# Patient Record
Sex: MALE | Race: OTHER | HISPANIC OR LATINO
[De-identification: names, ages, dates, MRNs, and addresses within clinical notes are randomized per-mention and may not be internally consistent; named-entity substitution may affect disease eponyms.]

---

## 2017-01-05 ENCOUNTER — APPOINTMENT (OUTPATIENT)
Dept: INTERNAL MEDICINE | Facility: CLINIC | Age: 32
End: 2017-01-05

## 2017-01-05 RX ORDER — CIPROFLOXACIN 3 MG/ML
0.3 SOLUTION OPHTHALMIC
Qty: 1 | Refills: 3 | Status: COMPLETED | COMMUNITY
Start: 2017-01-05 | End: 2017-05-05

## 2017-02-22 ENCOUNTER — APPOINTMENT (OUTPATIENT)
Dept: OPHTHALMOLOGY | Facility: CLINIC | Age: 32
End: 2017-02-22

## 2017-03-13 ENCOUNTER — MEDICATION RENEWAL (OUTPATIENT)
Age: 32
End: 2017-03-13

## 2017-05-30 ENCOUNTER — LABORATORY RESULT (OUTPATIENT)
Age: 32
End: 2017-05-30

## 2017-05-30 ENCOUNTER — APPOINTMENT (OUTPATIENT)
Dept: INTERNAL MEDICINE | Facility: CLINIC | Age: 32
End: 2017-05-30

## 2017-05-30 VITALS
RESPIRATION RATE: 12 BRPM | TEMPERATURE: 98.3 F | OXYGEN SATURATION: 98 % | WEIGHT: 222 LBS | DIASTOLIC BLOOD PRESSURE: 79 MMHG | BODY MASS INDEX: 31.78 KG/M2 | SYSTOLIC BLOOD PRESSURE: 121 MMHG | HEIGHT: 70 IN | HEART RATE: 80 BPM

## 2017-05-30 DIAGNOSIS — B02.9 ZOSTER W/OUT COMPLICATIONS: ICD-10-CM

## 2017-05-30 DIAGNOSIS — Z84.1 FAMILY HISTORY OF DISORDERS OF KIDNEY AND URETER: ICD-10-CM

## 2017-05-30 RX ORDER — PANCRELIPASE LIPASE, PANCRELIPASE AMYLASE, AND PANCRELIPASE PROTEASE 4200; 24600; 14200 [USP'U]/1; [USP'U]/1; [USP'U]/1
4200-14200 CAPSULE, DELAYED RELEASE ORAL
Refills: 0 | Status: DISCONTINUED | COMMUNITY
End: 2017-05-30

## 2017-05-30 RX ORDER — ALBUTEROL SULFATE 90 UG/1
AEROSOL, METERED RESPIRATORY (INHALATION)
Refills: 0 | Status: DISCONTINUED | COMMUNITY
End: 2017-05-30

## 2017-05-30 RX ORDER — OSELTAMIVIR PHOSPHATE 75 MG/1
75 CAPSULE ORAL
Qty: 10 | Refills: 0 | Status: DISCONTINUED | COMMUNITY
Start: 2017-03-13 | End: 2017-05-30

## 2017-05-31 LAB
25(OH)D3 SERPL-MCNC: 14.6 NG/ML
ALBUMIN SERPL ELPH-MCNC: 4.3 G/DL
ALP BLD-CCNC: 85 U/L
ALT SERPL-CCNC: 29 U/L
ANION GAP SERPL CALC-SCNC: 12 MMOL/L
AST SERPL-CCNC: 20 U/L
BILIRUB SERPL-MCNC: 0.3 MG/DL
BUN SERPL-MCNC: 11 MG/DL
CALCIUM SERPL-MCNC: 9.5 MG/DL
CHLORIDE SERPL-SCNC: 103 MMOL/L
CHOLEST SERPL-MCNC: 205 MG/DL
CHOLEST/HDLC SERPL: 5 RATIO
CO2 SERPL-SCNC: 25 MMOL/L
CREAT SERPL-MCNC: 0.9 MG/DL
FOLATE RBC-MCNC: 1229 NG/ML
FOLATE SERPL-MCNC: 9.7 NG/ML
GLUCOSE SERPL-MCNC: 99 MG/DL
HAV IGM SER QL: NONREACTIVE
HBV CORE IGM SER QL: NONREACTIVE
HBV SURFACE AG SER QL: NONREACTIVE
HCT VFR BLD CALC: 45 %
HCV AB SER QL: NONREACTIVE
HCV S/CO RATIO: 0.09 S/CO
HDLC SERPL-MCNC: 41 MG/DL
HIV1+2 AB SPEC QL IA.RAPID: NONREACTIVE
HSV 1+2 IGG SER IA-IMP: NEGATIVE
HSV 1+2 IGG SER IA-IMP: NEGATIVE
HSV1 IGG SER QL: 0.14 INDEX
HSV2 IGG SER QL: 0.07 INDEX
LDLC SERPL CALC-MCNC: 106 MG/DL
POTASSIUM SERPL-SCNC: 5 MMOL/L
PROT SERPL-MCNC: 7.3 G/DL
SODIUM SERPL-SCNC: 140 MMOL/L
TRIGL SERPL-MCNC: 290 MG/DL
TSH SERPL-ACNC: 1.34 UIU/ML
VIT B12 SERPL-MCNC: 507 PG/ML

## 2017-06-01 LAB
BASOPHILS # BLD AUTO: 0.01 K/UL
BASOPHILS NFR BLD AUTO: 0.2 %
C TRACH RRNA SPEC QL NAA+PROBE: NORMAL
EOSINOPHIL # BLD AUTO: 0.09 K/UL
EOSINOPHIL NFR BLD AUTO: 1.5 %
HCT VFR BLD CALC: 45.3 %
HGB BLD-MCNC: 14.2 G/DL
IMM GRANULOCYTES NFR BLD AUTO: 0.3 %
LYMPHOCYTES # BLD AUTO: 1.72 K/UL
LYMPHOCYTES NFR BLD AUTO: 28.8 %
MAN DIFF?: NORMAL
MCHC RBC-ENTMCNC: 30.9 PG
MCHC RBC-ENTMCNC: 31.3 GM/DL
MCV RBC AUTO: 98.7 FL
MONOCYTES # BLD AUTO: 0.55 K/UL
MONOCYTES NFR BLD AUTO: 9.2 %
N GONORRHOEA RRNA SPEC QL NAA+PROBE: NORMAL
NEUTROPHILS # BLD AUTO: 3.58 K/UL
NEUTROPHILS NFR BLD AUTO: 60 %
PLATELET # BLD AUTO: 258 K/UL
RBC # BLD: 4.59 M/UL
RBC # FLD: 12.6 %
SOURCE AMPLIFICATION: NORMAL
WBC # FLD AUTO: 5.97 K/UL

## 2017-06-01 RX ORDER — CHOLECALCIFEROL (VITAMIN D3) 50 MCG
50 MCG TABLET ORAL
Qty: 30 | Refills: 3 | Status: ACTIVE | COMMUNITY
Start: 2017-06-01 | End: 1900-01-01

## 2017-06-22 ENCOUNTER — APPOINTMENT (OUTPATIENT)
Dept: HEMATOLOGY ONCOLOGY | Facility: CLINIC | Age: 32
End: 2017-06-22

## 2017-06-29 ENCOUNTER — APPOINTMENT (OUTPATIENT)
Dept: INTERNAL MEDICINE | Facility: CLINIC | Age: 32
End: 2017-06-29

## 2017-08-10 ENCOUNTER — LABORATORY RESULT (OUTPATIENT)
Age: 32
End: 2017-08-10

## 2017-08-10 ENCOUNTER — APPOINTMENT (OUTPATIENT)
Dept: INTERNAL MEDICINE | Facility: CLINIC | Age: 32
End: 2017-08-10
Payer: COMMERCIAL

## 2017-08-10 ENCOUNTER — NON-APPOINTMENT (OUTPATIENT)
Age: 32
End: 2017-08-10

## 2017-08-10 VITALS
OXYGEN SATURATION: 100 % | HEART RATE: 86 BPM | DIASTOLIC BLOOD PRESSURE: 74 MMHG | RESPIRATION RATE: 12 BRPM | BODY MASS INDEX: 32.07 KG/M2 | TEMPERATURE: 99 F | SYSTOLIC BLOOD PRESSURE: 103 MMHG | HEIGHT: 70 IN | WEIGHT: 224 LBS

## 2017-08-10 VITALS — DIASTOLIC BLOOD PRESSURE: 60 MMHG | SYSTOLIC BLOOD PRESSURE: 120 MMHG

## 2017-08-10 DIAGNOSIS — K58.9 IRRITABLE BOWEL SYNDROME W/OUT DIARRHEA: ICD-10-CM

## 2017-08-10 DIAGNOSIS — K90.9 INTESTINAL MALABSORPTION, UNSPECIFIED: ICD-10-CM

## 2017-08-10 PROCEDURE — 99395 PREV VISIT EST AGE 18-39: CPT | Mod: 25

## 2017-08-10 PROCEDURE — 93000 ELECTROCARDIOGRAM COMPLETE: CPT

## 2017-08-11 ENCOUNTER — NON-APPOINTMENT (OUTPATIENT)
Age: 32
End: 2017-08-11

## 2017-08-11 LAB
ALBUMIN SERPL ELPH-MCNC: 4.3 G/DL
ALP BLD-CCNC: 80 U/L
ALT SERPL-CCNC: 33 U/L
ANION GAP SERPL CALC-SCNC: 18 MMOL/L
AST SERPL-CCNC: 25 U/L
BASOPHILS # BLD AUTO: 0.01 K/UL
BASOPHILS NFR BLD AUTO: 0.1 %
BILIRUB SERPL-MCNC: 0.2 MG/DL
BUN SERPL-MCNC: 14 MG/DL
CALCIUM SERPL-MCNC: 9.1 MG/DL
CHLORIDE SERPL-SCNC: 105 MMOL/L
CHOLEST SERPL-MCNC: 199 MG/DL
CHOLEST/HDLC SERPL: 5.1 RATIO
CO2 SERPL-SCNC: 20 MMOL/L
CREAT SERPL-MCNC: 0.87 MG/DL
EOSINOPHIL # BLD AUTO: 0.17 K/UL
EOSINOPHIL NFR BLD AUTO: 2.1 %
GLUCOSE SERPL-MCNC: 86 MG/DL
HCT VFR BLD CALC: 42.6 %
HDLC SERPL-MCNC: 39 MG/DL
HGB BLD-MCNC: 13.8 G/DL
IMM GRANULOCYTES NFR BLD AUTO: 0.2 %
LDLC SERPL CALC-MCNC: 108 MG/DL
LYMPHOCYTES # BLD AUTO: 2.68 K/UL
LYMPHOCYTES NFR BLD AUTO: 33.3 %
MAN DIFF?: NORMAL
MCHC RBC-ENTMCNC: 31.9 PG
MCHC RBC-ENTMCNC: 32.4 GM/DL
MCV RBC AUTO: 98.6 FL
MONOCYTES # BLD AUTO: 0.64 K/UL
MONOCYTES NFR BLD AUTO: 8 %
NEUTROPHILS # BLD AUTO: 4.52 K/UL
NEUTROPHILS NFR BLD AUTO: 56.3 %
PLATELET # BLD AUTO: 259 K/UL
POTASSIUM SERPL-SCNC: 4.2 MMOL/L
PROT SERPL-MCNC: 7.7 G/DL
RBC # BLD: 4.32 M/UL
RBC # FLD: 12.7 %
SODIUM SERPL-SCNC: 143 MMOL/L
TRIGL SERPL-MCNC: 261 MG/DL
TSH SERPL-ACNC: 0.61 UIU/ML
WBC # FLD AUTO: 8.04 K/UL

## 2017-08-14 LAB — 25(OH)D3 SERPL-MCNC: 22.9 NG/ML

## 2017-08-16 LAB
TESTOST BND SERPL-MCNC: 7.5 PG/ML
TESTOST SERPL-MCNC: 233.2 NG/DL

## 2017-08-18 LAB
APPEARANCE: CLEAR
BILIRUBIN URINE: NEGATIVE
BLOOD URINE: NEGATIVE
COLOR: YELLOW
GLUCOSE QUALITATIVE U: NORMAL MG/DL
KETONES URINE: ABNORMAL
LEUKOCYTE ESTERASE URINE: NEGATIVE
NITRITE URINE: NEGATIVE
PH URINE: 5.5
PROTEIN URINE: ABNORMAL MG/DL
SPECIFIC GRAVITY URINE: 1.03
UROBILINOGEN URINE: NORMAL MG/DL

## 2017-09-22 ENCOUNTER — RX RENEWAL (OUTPATIENT)
Age: 32
End: 2017-09-22

## 2017-11-22 ENCOUNTER — TRANSCRIPTION ENCOUNTER (OUTPATIENT)
Age: 32
End: 2017-11-22

## 2018-03-26 ENCOUNTER — APPOINTMENT (OUTPATIENT)
Dept: INTERNAL MEDICINE | Facility: CLINIC | Age: 33
End: 2018-03-26
Payer: COMMERCIAL

## 2018-03-26 VITALS
WEIGHT: 232 LBS | DIASTOLIC BLOOD PRESSURE: 83 MMHG | OXYGEN SATURATION: 97 % | SYSTOLIC BLOOD PRESSURE: 142 MMHG | RESPIRATION RATE: 12 BRPM | HEIGHT: 70 IN | TEMPERATURE: 98.3 F | BODY MASS INDEX: 33.21 KG/M2 | HEART RATE: 93 BPM

## 2018-03-26 VITALS — DIASTOLIC BLOOD PRESSURE: 80 MMHG | SYSTOLIC BLOOD PRESSURE: 120 MMHG

## 2018-03-26 DIAGNOSIS — Z78.9 OTHER SPECIFIED HEALTH STATUS: ICD-10-CM

## 2018-03-26 PROCEDURE — 99215 OFFICE O/P EST HI 40 MIN: CPT

## 2018-04-02 ENCOUNTER — APPOINTMENT (OUTPATIENT)
Dept: NEUROLOGY | Facility: CLINIC | Age: 33
End: 2018-04-02
Payer: COMMERCIAL

## 2018-04-02 ENCOUNTER — APPOINTMENT (OUTPATIENT)
Dept: OTOLARYNGOLOGY | Facility: CLINIC | Age: 33
End: 2018-04-02
Payer: COMMERCIAL

## 2018-04-02 VITALS
DIASTOLIC BLOOD PRESSURE: 69 MMHG | BODY MASS INDEX: 32.93 KG/M2 | SYSTOLIC BLOOD PRESSURE: 131 MMHG | HEART RATE: 86 BPM | WEIGHT: 230 LBS | HEIGHT: 70 IN

## 2018-04-02 VITALS
BODY MASS INDEX: 32.93 KG/M2 | DIASTOLIC BLOOD PRESSURE: 72 MMHG | HEART RATE: 82 BPM | HEIGHT: 70 IN | WEIGHT: 230 LBS | SYSTOLIC BLOOD PRESSURE: 117 MMHG

## 2018-04-02 DIAGNOSIS — Z78.9 OTHER SPECIFIED HEALTH STATUS: ICD-10-CM

## 2018-04-02 DIAGNOSIS — Z83.511 FAMILY HISTORY OF GLAUCOMA: ICD-10-CM

## 2018-04-02 DIAGNOSIS — J30.1 ALLERGIC RHINITIS DUE TO POLLEN: ICD-10-CM

## 2018-04-02 DIAGNOSIS — L80 VITILIGO: ICD-10-CM

## 2018-04-02 DIAGNOSIS — Z86.39 PERSONAL HISTORY OF OTHER ENDOCRINE, NUTRITIONAL AND METABOLIC DISEASE: ICD-10-CM

## 2018-04-02 PROCEDURE — 99204 OFFICE O/P NEW MOD 45 MIN: CPT | Mod: 25

## 2018-04-02 PROCEDURE — 92550 TYMPANOMETRY & REFLEX THRESH: CPT

## 2018-04-02 PROCEDURE — 99205 OFFICE O/P NEW HI 60 MIN: CPT

## 2018-04-02 PROCEDURE — 92557 COMPREHENSIVE HEARING TEST: CPT

## 2018-04-03 ENCOUNTER — APPOINTMENT (OUTPATIENT)
Dept: MRI IMAGING | Facility: CLINIC | Age: 33
End: 2018-04-03
Payer: COMMERCIAL

## 2018-04-03 ENCOUNTER — OUTPATIENT (OUTPATIENT)
Dept: OUTPATIENT SERVICES | Facility: HOSPITAL | Age: 33
LOS: 1 days | End: 2018-04-03
Payer: COMMERCIAL

## 2018-04-03 DIAGNOSIS — Z00.8 ENCOUNTER FOR OTHER GENERAL EXAMINATION: ICD-10-CM

## 2018-04-03 PROCEDURE — 70549 MR ANGIOGRAPH NECK W/O&W/DYE: CPT

## 2018-04-03 PROCEDURE — 70546 MR ANGIOGRAPH HEAD W/O&W/DYE: CPT

## 2018-04-03 PROCEDURE — 70546 MR ANGIOGRAPH HEAD W/O&W/DYE: CPT | Mod: 26,59

## 2018-04-03 PROCEDURE — 70553 MRI BRAIN STEM W/O & W/DYE: CPT | Mod: 26

## 2018-04-03 PROCEDURE — 70549 MR ANGIOGRAPH NECK W/O&W/DYE: CPT | Mod: 26

## 2018-04-03 PROCEDURE — A9585: CPT

## 2018-04-03 PROCEDURE — 70553 MRI BRAIN STEM W/O & W/DYE: CPT

## 2018-04-04 ENCOUNTER — APPOINTMENT (OUTPATIENT)
Dept: OPHTHALMOLOGY | Facility: CLINIC | Age: 33
End: 2018-04-04

## 2018-04-10 ENCOUNTER — OTHER (OUTPATIENT)
Age: 33
End: 2018-04-10

## 2018-04-10 ENCOUNTER — MESSAGE (OUTPATIENT)
Age: 33
End: 2018-04-10

## 2018-04-17 ENCOUNTER — APPOINTMENT (OUTPATIENT)
Dept: INTERNAL MEDICINE | Facility: CLINIC | Age: 33
End: 2018-04-17

## 2018-05-16 ENCOUNTER — APPOINTMENT (OUTPATIENT)
Dept: OTOLARYNGOLOGY | Facility: CLINIC | Age: 33
End: 2018-05-16
Payer: COMMERCIAL

## 2018-05-16 PROCEDURE — 92585: CPT

## 2018-05-16 PROCEDURE — 92537 CALORIC VSTBLR TEST W/REC: CPT

## 2018-05-16 PROCEDURE — 92584 ELECTROCOCHLEOGRAPHY: CPT

## 2018-05-16 PROCEDURE — 92540 BASIC VESTIBULAR EVALUATION: CPT

## 2018-05-18 ENCOUNTER — MESSAGE (OUTPATIENT)
Age: 33
End: 2018-05-18

## 2018-07-02 ENCOUNTER — APPOINTMENT (OUTPATIENT)
Dept: NEUROLOGY | Facility: CLINIC | Age: 33
End: 2018-07-02
Payer: COMMERCIAL

## 2018-07-02 VITALS
BODY MASS INDEX: 33.21 KG/M2 | WEIGHT: 232 LBS | HEIGHT: 70 IN | SYSTOLIC BLOOD PRESSURE: 101 MMHG | HEART RATE: 94 BPM | DIASTOLIC BLOOD PRESSURE: 68 MMHG

## 2018-07-02 DIAGNOSIS — G43.109 MIGRAINE WITH AURA, NOT INTRACTABLE, W/OUT STATUS MIGRAINOSUS: ICD-10-CM

## 2018-07-02 DIAGNOSIS — H93.13 TINNITUS, BILATERAL: ICD-10-CM

## 2018-07-02 DIAGNOSIS — H90.3 SENSORINEURAL HEARING LOSS, BILATERAL: ICD-10-CM

## 2018-07-02 DIAGNOSIS — H90.41 SENSORINEURAL HEARING LOSS, UNILATERAL, RIGHT EAR, WITH UNRESTRICTED HEARING ON THE CONTRALATERAL SIDE: ICD-10-CM

## 2018-07-02 PROCEDURE — 99214 OFFICE O/P EST MOD 30 MIN: CPT

## 2018-08-16 ENCOUNTER — APPOINTMENT (OUTPATIENT)
Dept: OTOLARYNGOLOGY | Facility: CLINIC | Age: 33
End: 2018-08-16
Payer: COMMERCIAL

## 2018-08-16 VITALS
WEIGHT: 232 LBS | HEIGHT: 70 IN | HEART RATE: 89 BPM | BODY MASS INDEX: 33.21 KG/M2 | SYSTOLIC BLOOD PRESSURE: 123 MMHG | DIASTOLIC BLOOD PRESSURE: 80 MMHG

## 2018-08-16 DIAGNOSIS — Z87.19 PERSONAL HISTORY OF OTHER DISEASES OF THE DIGESTIVE SYSTEM: ICD-10-CM

## 2018-08-16 DIAGNOSIS — Z83.3 FAMILY HISTORY OF DIABETES MELLITUS: ICD-10-CM

## 2018-08-16 DIAGNOSIS — Z88.9 ALLERGY STATUS TO UNSPECIFIED DRUGS, MEDICAMENTS AND BIOLOGICAL SUBSTANCES: ICD-10-CM

## 2018-08-16 DIAGNOSIS — Z80.9 FAMILY HISTORY OF MALIGNANT NEOPLASM, UNSPECIFIED: ICD-10-CM

## 2018-08-16 PROBLEM — H93.13 TINNITUS, BILATERAL: Status: ACTIVE | Noted: 2018-04-02

## 2018-08-16 PROBLEM — H90.3 SENSORY HEARING LOSS, BILATERAL: Status: ACTIVE | Noted: 2018-04-02

## 2018-08-16 PROBLEM — H90.41 RIGHT ASYMMETRICAL SNHL: Status: ACTIVE | Noted: 2018-04-02

## 2018-08-16 PROBLEM — G43.109 MIGRAINE WITH AURA AND WITHOUT STATUS MIGRAINOSUS, NOT INTRACTABLE: Status: ACTIVE | Noted: 2017-02-22

## 2018-08-16 PROCEDURE — 99214 OFFICE O/P EST MOD 30 MIN: CPT

## 2018-09-04 ENCOUNTER — APPOINTMENT (OUTPATIENT)
Dept: RADIOLOGY | Facility: CLINIC | Age: 33
End: 2018-09-04
Payer: COMMERCIAL

## 2018-09-04 ENCOUNTER — OUTPATIENT (OUTPATIENT)
Dept: OUTPATIENT SERVICES | Facility: HOSPITAL | Age: 33
LOS: 1 days | End: 2018-09-04
Payer: COMMERCIAL

## 2018-09-04 DIAGNOSIS — Z00.8 ENCOUNTER FOR OTHER GENERAL EXAMINATION: ICD-10-CM

## 2018-09-04 PROCEDURE — 72110 X-RAY EXAM L-2 SPINE 4/>VWS: CPT | Mod: 26

## 2018-09-04 PROCEDURE — 72110 X-RAY EXAM L-2 SPINE 4/>VWS: CPT

## 2018-09-04 PROCEDURE — 72050 X-RAY EXAM NECK SPINE 4/5VWS: CPT | Mod: 26

## 2018-09-04 PROCEDURE — 72050 X-RAY EXAM NECK SPINE 4/5VWS: CPT

## 2018-09-14 ENCOUNTER — OUTPATIENT (OUTPATIENT)
Dept: OUTPATIENT SERVICES | Facility: HOSPITAL | Age: 33
LOS: 1 days | End: 2018-09-14
Payer: COMMERCIAL

## 2018-09-14 ENCOUNTER — APPOINTMENT (OUTPATIENT)
Dept: MRI IMAGING | Facility: CLINIC | Age: 33
End: 2018-09-14
Payer: COMMERCIAL

## 2018-09-14 DIAGNOSIS — Z00.8 ENCOUNTER FOR OTHER GENERAL EXAMINATION: ICD-10-CM

## 2018-09-14 PROCEDURE — 72141 MRI NECK SPINE W/O DYE: CPT

## 2018-09-14 PROCEDURE — 72141 MRI NECK SPINE W/O DYE: CPT | Mod: 26

## 2018-10-24 ENCOUNTER — APPOINTMENT (OUTPATIENT)
Dept: ENDOCRINOLOGY | Facility: CLINIC | Age: 33
End: 2018-10-24
Payer: COMMERCIAL

## 2018-10-24 VITALS
SYSTOLIC BLOOD PRESSURE: 112 MMHG | DIASTOLIC BLOOD PRESSURE: 78 MMHG | BODY MASS INDEX: 32.21 KG/M2 | HEIGHT: 70 IN | HEART RATE: 105 BPM | WEIGHT: 225 LBS | OXYGEN SATURATION: 98 %

## 2018-10-24 PROCEDURE — 99204 OFFICE O/P NEW MOD 45 MIN: CPT

## 2018-10-24 RX ORDER — ALBUTEROL SULFATE 90 UG/1
108 (90 BASE) AEROSOL, METERED RESPIRATORY (INHALATION)
Qty: 6.7 | Refills: 5 | Status: COMPLETED | COMMUNITY
Start: 2017-01-05 | End: 2018-10-24

## 2018-10-24 RX ORDER — CEFUROXIME AXETIL 500 MG/1
500 TABLET ORAL
Qty: 14 | Refills: 0 | Status: COMPLETED | COMMUNITY
Start: 2018-03-26 | End: 2018-10-24

## 2018-10-24 RX ORDER — METHYLPREDNISOLONE 4 MG/1
4 TABLET ORAL
Qty: 21 | Refills: 0 | Status: COMPLETED | COMMUNITY
Start: 2018-08-16 | End: 2018-10-24

## 2018-10-24 RX ORDER — SACCHAROMYCES BOULARDII 50 MG
250 CAPSULE ORAL TWICE DAILY
Qty: 40 | Refills: 0 | Status: COMPLETED | COMMUNITY
Start: 2017-09-22 | End: 2018-10-24

## 2018-10-24 RX ORDER — DIAZEPAM 5 MG/1
5 TABLET ORAL
Qty: 14 | Refills: 0 | Status: COMPLETED | COMMUNITY
Start: 2018-08-16 | End: 2018-10-24

## 2018-10-27 ENCOUNTER — EMERGENCY (EMERGENCY)
Facility: HOSPITAL | Age: 33
LOS: 1 days | Discharge: HOME | End: 2018-10-27
Attending: EMERGENCY MEDICINE
Payer: COMMERCIAL

## 2018-10-27 VITALS
WEIGHT: 210.1 LBS | SYSTOLIC BLOOD PRESSURE: 126 MMHG | HEIGHT: 70 IN | OXYGEN SATURATION: 98 % | TEMPERATURE: 98 F | RESPIRATION RATE: 18 BRPM | HEART RATE: 94 BPM | DIASTOLIC BLOOD PRESSURE: 86 MMHG

## 2018-10-27 PROCEDURE — 76870 US EXAM SCROTUM: CPT | Mod: 26

## 2018-10-27 PROCEDURE — 99284 EMERGENCY DEPT VISIT MOD MDM: CPT | Mod: 25

## 2018-10-27 RX ORDER — MORPHINE SULFATE 50 MG/1
4 CAPSULE, EXTENDED RELEASE ORAL ONCE
Qty: 0 | Refills: 0 | Status: DISCONTINUED | OUTPATIENT
Start: 2018-10-27 | End: 2018-10-27

## 2018-10-27 RX ADMIN — MORPHINE SULFATE 4 MILLIGRAM(S): 50 CAPSULE, EXTENDED RELEASE ORAL at 23:05

## 2018-10-28 PROCEDURE — 96374 THER/PROPH/DIAG INJ IV PUSH: CPT

## 2018-10-28 PROCEDURE — 76870 US EXAM SCROTUM: CPT

## 2018-10-28 PROCEDURE — 96372 THER/PROPH/DIAG INJ SC/IM: CPT | Mod: XU

## 2018-10-28 PROCEDURE — 99284 EMERGENCY DEPT VISIT MOD MDM: CPT | Mod: 25

## 2018-10-28 RX ORDER — CEFTRIAXONE 500 MG/1
250 INJECTION, POWDER, FOR SOLUTION INTRAMUSCULAR; INTRAVENOUS ONCE
Qty: 0 | Refills: 0 | Status: COMPLETED | OUTPATIENT
Start: 2018-10-28 | End: 2018-10-28

## 2018-10-28 RX ORDER — IBUPROFEN 200 MG
600 TABLET ORAL ONCE
Qty: 0 | Refills: 0 | Status: COMPLETED | OUTPATIENT
Start: 2018-10-28 | End: 2018-10-28

## 2018-10-28 RX ORDER — IBUPROFEN 200 MG
1 TABLET ORAL
Qty: 15 | Refills: 0 | OUTPATIENT
Start: 2018-10-28 | End: 2018-11-01

## 2018-10-28 RX ORDER — CEPHALEXIN 500 MG
1 CAPSULE ORAL
Qty: 21 | Refills: 0 | OUTPATIENT
Start: 2018-10-28 | End: 2018-11-03

## 2018-10-28 RX ORDER — AZITHROMYCIN 500 MG/1
1000 TABLET, FILM COATED ORAL ONCE
Qty: 0 | Refills: 0 | Status: COMPLETED | OUTPATIENT
Start: 2018-10-28 | End: 2018-10-28

## 2018-10-28 RX ADMIN — CEFTRIAXONE 250 MILLIGRAM(S): 500 INJECTION, POWDER, FOR SOLUTION INTRAMUSCULAR; INTRAVENOUS at 00:42

## 2018-10-28 RX ADMIN — Medication 600 MILLIGRAM(S): at 00:42

## 2018-10-28 RX ADMIN — AZITHROMYCIN 1000 MILLIGRAM(S): 500 TABLET, FILM COATED ORAL at 00:42

## 2018-10-28 NOTE — ED PROVIDER NOTE - MEDICAL DECISION MAKING DETAILS
32 year old M Pt w/ right testicular pain, will sonogram to rule out testicular torsion and pain management

## 2018-10-28 NOTE — ED PROVIDER NOTE - OBJECTIVE STATEMENT
32 year old M Pt w/ PMHx of Low Testosterone presents to ED c/o testicular pain. Pt last visit to endocrinologist, while there doctor manipulated right testicle, Pt felt sudden onset of right testicular pain, described as dull aching pain until today, Today, pain worsened until intolerable. NKDA

## 2018-10-28 NOTE — ED ADULT NURSE NOTE - TEMPLATE
Alert and oriented to person, place, time/situation. normal mood and affect. no apparent risk to self or others.  Symptoms

## 2018-10-29 ENCOUNTER — LABORATORY RESULT (OUTPATIENT)
Age: 33
End: 2018-10-29

## 2018-10-29 ENCOUNTER — APPOINTMENT (OUTPATIENT)
Dept: INTERNAL MEDICINE | Facility: CLINIC | Age: 33
End: 2018-10-29
Payer: COMMERCIAL

## 2018-10-29 VITALS
WEIGHT: 220 LBS | SYSTOLIC BLOOD PRESSURE: 120 MMHG | HEIGHT: 70 IN | DIASTOLIC BLOOD PRESSURE: 70 MMHG | BODY MASS INDEX: 31.5 KG/M2

## 2018-10-29 DIAGNOSIS — Z87.898 PERSONAL HISTORY OF OTHER SPECIFIED CONDITIONS: ICD-10-CM

## 2018-10-29 DIAGNOSIS — Z87.09 PERSONAL HISTORY OF OTHER DISEASES OF THE RESPIRATORY SYSTEM: ICD-10-CM

## 2018-10-29 DIAGNOSIS — Z87.39 PERSONAL HISTORY OF OTHER DISEASES OF THE MUSCULOSKELETAL SYSTEM AND CONNECTIVE TISSUE: ICD-10-CM

## 2018-10-29 DIAGNOSIS — E29.1 TESTICULAR HYPOFUNCTION: ICD-10-CM

## 2018-10-29 DIAGNOSIS — Z86.69 PERSONAL HISTORY OF OTHER DISEASES OF THE NERVOUS SYSTEM AND SENSE ORGANS: ICD-10-CM

## 2018-10-29 DIAGNOSIS — R07.89 OTHER CHEST PAIN: ICD-10-CM

## 2018-10-29 DIAGNOSIS — Z00.00 ENCOUNTER FOR GENERAL ADULT MEDICAL EXAMINATION W/OUT ABNORMAL FINDINGS: ICD-10-CM

## 2018-10-29 DIAGNOSIS — H53.9 UNSPECIFIED VISUAL DISTURBANCE: ICD-10-CM

## 2018-10-29 DIAGNOSIS — Z86.711 PERSONAL HISTORY OF PULMONARY EMBOLISM: ICD-10-CM

## 2018-10-29 LAB
BILIRUB UR QL STRIP: NORMAL
CLARITY UR: NORMAL
COLLECTION METHOD: NORMAL
GLUCOSE UR-MCNC: NEGATIVE
HCG UR QL: 0.2 EU/DL
HGB UR QL STRIP.AUTO: NEGATIVE
KETONES UR-MCNC: NORMAL
LEUKOCYTE ESTERASE UR QL STRIP: NEGATIVE
NITRITE UR QL STRIP: NEGATIVE
PH UR STRIP: 6
PROT UR STRIP-MCNC: NORMAL
SP GR UR STRIP: >=1.03

## 2018-10-29 PROCEDURE — 99395 PREV VISIT EST AGE 18-39: CPT | Mod: 25

## 2018-10-29 PROCEDURE — 99385 PREV VISIT NEW AGE 18-39: CPT | Mod: 25

## 2018-10-29 PROCEDURE — G0444 DEPRESSION SCREEN ANNUAL: CPT

## 2018-10-29 PROCEDURE — 36415 COLL VENOUS BLD VENIPUNCTURE: CPT

## 2018-10-29 PROCEDURE — 81003 URINALYSIS AUTO W/O SCOPE: CPT | Mod: QW

## 2018-10-29 NOTE — PHYSICAL EXAM
[No Acute Distress] : no acute distress [Well Nourished] : well nourished [Well Developed] : well developed [Well-Appearing] : well-appearing [Normal Voice/Communication] : normal voice/communication [Normal Sclera/Conjunctiva] : normal sclera/conjunctiva [PERRL] : pupils equal round and reactive to light [Normal Outer Ear/Nose] : the outer ears and nose were normal in appearance [Normal Oropharynx] : the oropharynx was normal [Normal TMs] : both tympanic membranes were normal [Normal Nasal Mucosa] : the nasal mucosa was normal [No JVD] : no jugular venous distention [Supple] : supple [No Lymphadenopathy] : no lymphadenopathy [Thyroid Normal, No Nodules] : the thyroid was normal and there were no nodules present [No Respiratory Distress] : no respiratory distress  [No Accessory Muscle Use] : no accessory muscle use [Normal Percussion] : the chest was normal to percussion [Normal Rate] : normal rate  [Regular Rhythm] : with a regular rhythm [Normal S1, S2] : normal S1 and S2 [No Abdominal Bruit] : a ~M bruit was not heard ~T in the abdomen [No Varicosities] : no varicosities [Pedal Pulses Present] : the pedal pulses are present [No Edema] : there was no peripheral edema [No Extremity Clubbing/Cyanosis] : no extremity clubbing/cyanosis [No Palpable Aorta] : no palpable aorta [Normal Appearance] : normal in appearance [No Masses] : no palpable masses [No Nipple Discharge] : no nipple discharge [No Axillary Lymphadenopathy] : no axillary lymphadenopathy [Soft] : abdomen soft [Non Tender] : non-tender [Non-distended] : non-distended [No HSM] : no HSM [Normal Bowel Sounds] : normal bowel sounds [No Hernias] : no hernias [Scrotum] : the scrotum was normal [Normal Supraclavicular Nodes] : no supraclavicular lymphadenopathy [Normal Axillary Nodes] : no axillary lymphadenopathy [Normal Posterior Cervical Nodes] : no posterior cervical lymphadenopathy [Normal Femoral Nodes] : no femoral lymphadenopathy [Normal Anterior Cervical Nodes] : no anterior cervical lymphadenopathy [Normal Inguinal Nodes] : no inguinal lymphadenopathy [Coordination Grossly Intact] : coordination grossly intact [No Focal Deficits] : no focal deficits [de-identified] : Lungs slight expiratory wheeze [FreeTextEntry1] : Mild right testicular tenderness

## 2018-10-29 NOTE — ASSESSMENT
[FreeTextEntry1] : Patient is a healthy 32-year-old male with history of obesity, low testosterone, migraines, IBS, history of shingles, history of PE, vitamin D deficiency, tetrahydrofolate deficiency, tinnitus who presents for comprehensive annual physical examination, right testicular pain and wheezing\par \par 1.  Right testicular pain\par Urine dip showed trace blood\par Check UA culture\par GC chlamydia \par Warm soaks Advil for now\par \par 2.  Asthma\par Start Flovent and 88 mics and Ventolin as needed\par \par 3.  Low testosterone\par Repeat testosterone check estrogen FSH LH iron studies\par Observe rule out sleep apnea versus obesity to\par \par 4.  Obesity\par Counseled on diet and exercise check hemoglobin A1c\par \par 5.  Vitamin D deficiency\par Continue 2000 units p.o. daily of vitamin D\par Check level\par \par 6.  Health maintenance\par Received flu vaccine\par Check routine labs\par Follow-up in 4-6 months\par \par

## 2018-10-29 NOTE — HEALTH RISK ASSESSMENT
[Excellent] : ~his/her~  mood as  excellent [] : No [No falls in past year] : Patient reported no falls in the past year [0] : 2) Feeling down, depressed, or hopeless: Not at all (0) [de-identified] : social [Change in mental status noted] : No change in mental status noted [Language] : denies difficulty with language [Behavior] : denies difficulty with behavior [Learning/Retaining New Information] : denies difficulty learning/retaining new information [Reasoning] : denies difficulty with reasoning [Spatial Ability and Orientation] : denies difficulty with spatial ability and orientation [None] : None [With Family] : lives with family [Employed] : employed [College] : College [] :  [Sexually Active] : sexually active [High Risk Behavior] : no high risk behavior [Feels Safe at Home] : Feels safe at home [Fully functional (bathing, dressing, toileting, transferring, walking, feeding)] : Fully functional (bathing, dressing, toileting, transferring, walking, feeding) [Fully functional (using the telephone, shopping, preparing meals, housekeeping, doing laundry, using] : Fully functional and needs no help or supervision to perform IADLs (using the telephone, shopping, preparing meals, housekeeping, doing laundry, using transportation, managing medications and managing finances)

## 2018-10-29 NOTE — HISTORY OF PRESENT ILLNESS
[FreeTextEntry1] : Preventative annual physical exam and right testicular pain [de-identified] : Patient is a healthy 32-year-old male with history of overweight, low testosterone, migraines, history of shingles, hearing loss, asthma, irritable bowel syndrome, tinnitus, vitamin D deficiency deficiency, history of pulmonary embolism after surgery 2009, and vitiligo who presents for comprehensive annual physical examination also was seen 1 week ago in emergency room for right testicular pain after manipulation by endocrinologist patient workup and ER sonogram showed increased flow to right testicle no torsion no swelling patient notes pain persisting in spite of ibuprofen she denies dysuria

## 2018-11-26 LAB
25(OH)D3 SERPL-MCNC: 18.9 NG/ML
AFP-TM SERPL-MCNC: 1.1 NG/ML
ALBUMIN SERPL ELPH-MCNC: 4.3 G/DL
ALP BLD-CCNC: 86 U/L
ALT SERPL-CCNC: 73 U/L
ANION GAP SERPL CALC-SCNC: 17 MMOL/L
APPEARANCE: CLEAR
AST SERPL-CCNC: 40 U/L
BASOPHILS # BLD AUTO: 0.02 K/UL
BASOPHILS NFR BLD AUTO: 0.2 %
BILIRUB SERPL-MCNC: 0.5 MG/DL
BILIRUBIN URINE: NEGATIVE
BLOOD URINE: NEGATIVE
BUN SERPL-MCNC: 13 MG/DL
C TRACH RRNA SPEC QL NAA+PROBE: NOT DETECTED
CALCIUM SERPL-MCNC: 9.2 MG/DL
CHLORIDE SERPL-SCNC: 98 MMOL/L
CHOLEST SERPL-MCNC: 185 MG/DL
CHOLEST/HDLC SERPL: 5.3 RATIO
CO2 SERPL-SCNC: 24 MMOL/L
COLOR: ABNORMAL
CREAT SERPL-MCNC: 0.83 MG/DL
EOSINOPHIL # BLD AUTO: 0.21 K/UL
EOSINOPHIL NFR BLD AUTO: 2.5 %
ESTRADIOL SERPL-MCNC: 16 PG/ML
FERRITIN SERPL-MCNC: 314 NG/ML
FSH SERPL-MCNC: 2.3 IU/L
GLUCOSE QUALITATIVE U: NEGATIVE MG/DL
HBA1C MFR BLD HPLC: 5.5 %
HCG-TM SERPL-MCNC: <1 MIU/ML
HCT VFR BLD CALC: 43.7 %
HDLC SERPL-MCNC: 35 MG/DL
HGB BLD-MCNC: 14.2 G/DL
IMM GRANULOCYTES NFR BLD AUTO: 0.1 %
IRON SATN MFR SERPL: 37 %
IRON SERPL-MCNC: 106 UG/DL
KETONES URINE: NEGATIVE
LDLC SERPL CALC-MCNC: 114 MG/DL
LEUKOCYTE ESTERASE URINE: NEGATIVE
LH SERPL-ACNC: 1.4 IU/L
LYMPHOCYTES # BLD AUTO: 2.61 K/UL
LYMPHOCYTES NFR BLD AUTO: 31.7 %
MAN DIFF?: NORMAL
MCHC RBC-ENTMCNC: 31.7 PG
MCHC RBC-ENTMCNC: 32.5 GM/DL
MCV RBC AUTO: 97.5 FL
MONOCYTES # BLD AUTO: 0.59 K/UL
MONOCYTES NFR BLD AUTO: 7.2 %
N GONORRHOEA RRNA SPEC QL NAA+PROBE: NOT DETECTED
NEUTROPHILS # BLD AUTO: 4.8 K/UL
NEUTROPHILS NFR BLD AUTO: 58.3 %
NITRITE URINE: NEGATIVE
PH URINE: 5.5
PLATELET # BLD AUTO: 277 K/UL
POTASSIUM SERPL-SCNC: 4.2 MMOL/L
PROT SERPL-MCNC: 7 G/DL
PROTEIN URINE: NEGATIVE MG/DL
RBC # BLD: 4.48 M/UL
RBC # FLD: 12.2 %
SODIUM SERPL-SCNC: 139 MMOL/L
SOURCE AMPLIFICATION: NORMAL
SPECIFIC GRAVITY URINE: 1.03
TESTOST SERPL-MCNC: 171.3 NG/DL
TIBC SERPL-MCNC: 288 UG/DL
TRIGL SERPL-MCNC: 180 MG/DL
UIBC SERPL-MCNC: 182 UG/DL
UROBILINOGEN URINE: NEGATIVE MG/DL
WBC # FLD AUTO: 8.24 K/UL

## 2018-12-04 PROBLEM — R79.89 OTHER SPECIFIED ABNORMAL FINDINGS OF BLOOD CHEMISTRY: Chronic | Status: ACTIVE | Noted: 2018-10-28

## 2018-12-05 ENCOUNTER — LABORATORY RESULT (OUTPATIENT)
Age: 33
End: 2018-12-05

## 2018-12-05 ENCOUNTER — APPOINTMENT (OUTPATIENT)
Dept: OPHTHALMOLOGY | Facility: CLINIC | Age: 33
End: 2018-12-05
Payer: COMMERCIAL

## 2018-12-05 ENCOUNTER — OTHER (OUTPATIENT)
Age: 33
End: 2018-12-05

## 2018-12-05 DIAGNOSIS — Z00.00 ENCOUNTER FOR GENERAL ADULT MEDICAL EXAMINATION W/OUT ABNORMAL FINDINGS: ICD-10-CM

## 2018-12-05 PROCEDURE — 92004 COMPRE OPH EXAM NEW PT 1/>: CPT

## 2018-12-11 ENCOUNTER — APPOINTMENT (OUTPATIENT)
Dept: DERMATOLOGY | Facility: CLINIC | Age: 33
End: 2018-12-11
Payer: COMMERCIAL

## 2018-12-11 VITALS
WEIGHT: 220 LBS | HEIGHT: 70 IN | BODY MASS INDEX: 31.5 KG/M2 | TEMPERATURE: 98.4 F | DIASTOLIC BLOOD PRESSURE: 80 MMHG | SYSTOLIC BLOOD PRESSURE: 122 MMHG | HEART RATE: 80 BPM

## 2018-12-11 DIAGNOSIS — Z87.09 PERSONAL HISTORY OF OTHER DISEASES OF THE RESPIRATORY SYSTEM: ICD-10-CM

## 2018-12-11 DIAGNOSIS — L85.8 OTHER SPECIFIED EPIDERMAL THICKENING: ICD-10-CM

## 2018-12-11 DIAGNOSIS — Z78.9 OTHER SPECIFIED HEALTH STATUS: ICD-10-CM

## 2018-12-11 DIAGNOSIS — D22.9 MELANOCYTIC NEVI, UNSPECIFIED: ICD-10-CM

## 2018-12-11 DIAGNOSIS — Z12.83 ENCOUNTER FOR SCREENING FOR MALIGNANT NEOPLASM OF SKIN: ICD-10-CM

## 2018-12-11 DIAGNOSIS — Z87.898 PERSONAL HISTORY OF OTHER SPECIFIED CONDITIONS: ICD-10-CM

## 2018-12-11 DIAGNOSIS — G43.909 MIGRAINE, UNSPECIFIED, NOT INTRACTABLE, W/OUT STATUS MIGRAINOSUS: ICD-10-CM

## 2018-12-11 DIAGNOSIS — Z86.718 PERSONAL HISTORY OF OTHER VENOUS THROMBOSIS AND EMBOLISM: ICD-10-CM

## 2018-12-11 DIAGNOSIS — L80 VITILIGO: ICD-10-CM

## 2018-12-11 PROCEDURE — 99204 OFFICE O/P NEW MOD 45 MIN: CPT

## 2018-12-11 RX ORDER — TACROLIMUS 1 MG/G
0.1 OINTMENT TOPICAL TWICE DAILY
Qty: 1 | Refills: 1 | Status: ACTIVE | COMMUNITY
Start: 2018-12-11 | End: 1900-01-01

## 2018-12-18 ENCOUNTER — APPOINTMENT (OUTPATIENT)
Dept: DERMATOLOGY | Facility: CLINIC | Age: 33
End: 2018-12-18

## 2019-01-02 ENCOUNTER — RX RENEWAL (OUTPATIENT)
Age: 34
End: 2019-01-02

## 2019-01-08 ENCOUNTER — APPOINTMENT (OUTPATIENT)
Dept: OPHTHALMOLOGY | Facility: CLINIC | Age: 34
End: 2019-01-08

## 2019-02-08 RX ORDER — CETIRIZINE HYDROCHLORIDE 10 MG/1
10 CAPSULE, LIQUID FILLED ORAL
Qty: 30 | Refills: 1 | Status: COMPLETED | COMMUNITY
Start: 2017-05-30 | End: 2019-02-08

## 2019-02-08 RX ORDER — IBUPROFEN 800 MG/1
TABLET, FILM COATED ORAL
Refills: 0 | Status: COMPLETED | COMMUNITY
End: 2019-02-08

## 2019-02-08 RX ORDER — SUMATRIPTAN 50 MG/1
50 TABLET, FILM COATED ORAL
Qty: 50 | Refills: 2 | Status: COMPLETED | COMMUNITY
Start: 2018-04-02 | End: 2019-02-08

## 2019-02-11 DIAGNOSIS — Z86.39 PERSONAL HISTORY OF OTHER ENDOCRINE, NUTRITIONAL AND METABOLIC DISEASE: ICD-10-CM

## 2019-02-14 LAB
RUBV IGG FLD-ACNC: 1 INDEX
RUBV IGG SER-IMP: NORMAL

## 2019-02-18 LAB
TESTOST BND SERPL-MCNC: 12.5 PG/ML
TESTOST SERPL-MCNC: 339 NG/DL

## 2019-02-27 ENCOUNTER — APPOINTMENT (OUTPATIENT)
Dept: ENDOCRINOLOGY | Facility: CLINIC | Age: 34
End: 2019-02-27

## 2019-03-12 LAB
C TRACH RRNA SPEC QL NAA+PROBE: NOT DETECTED
N GONORRHOEA RRNA SPEC QL NAA+PROBE: NOT DETECTED
SOURCE AMPLIFICATION: NORMAL

## 2019-03-14 LAB
HSV 1+2 IGG SER IA-IMP: NEGATIVE
HSV 1+2 IGG SER IA-IMP: NEGATIVE
HSV1 IGG SER QL: 0.11 INDEX
HSV1 IGM SER QL: NORMAL TITER
HSV2 AB FLD-ACNC: NORMAL TITER
HSV2 IGG SER QL: 0.06 INDEX
T PALLIDUM AB SER QL IA: NEGATIVE

## 2019-03-21 ENCOUNTER — RX RENEWAL (OUTPATIENT)
Age: 34
End: 2019-03-21

## 2019-03-22 ENCOUNTER — MESSAGE (OUTPATIENT)
Age: 34
End: 2019-03-22

## 2019-03-22 RX ORDER — VALACYCLOVIR 1 G/1
1 TABLET, FILM COATED ORAL DAILY
Qty: 90 | Refills: 0 | Status: COMPLETED | COMMUNITY
Start: 2017-03-24 | End: 2019-03-21

## 2019-03-28 ENCOUNTER — APPOINTMENT (OUTPATIENT)
Dept: INTERNAL MEDICINE | Facility: CLINIC | Age: 34
End: 2019-03-28
Payer: COMMERCIAL

## 2019-03-28 VITALS
OXYGEN SATURATION: 98 % | DIASTOLIC BLOOD PRESSURE: 70 MMHG | WEIGHT: 210 LBS | HEART RATE: 82 BPM | HEIGHT: 70 IN | SYSTOLIC BLOOD PRESSURE: 120 MMHG | BODY MASS INDEX: 30.06 KG/M2

## 2019-03-28 PROCEDURE — 99214 OFFICE O/P EST MOD 30 MIN: CPT

## 2019-03-28 NOTE — HISTORY OF PRESENT ILLNESS
[FreeTextEntry8] : \par \par CC: Depression\par \par HPI: Patient is a 33-year-old male with history of asthma, overweight, migraines, history of shingles, vitamin D deficiency fatty liver who presents for complaint of depression.  Patient undergoing separation and possible divorce wife refuses to work on marriage patient notes insomnia, anhedonia, has thoughts of hurting himself but no active plan appetite poor.  No history of bipolar, manic symptoms

## 2019-03-28 NOTE — REVIEW OF SYSTEMS
[Fever] : no fever [Fatigue] : fatigue [Hot Flashes] : no hot flashes [Night Sweats] : no night sweats [Recent Change In Weight] : ~T recent weight change [Suicidal] : not suicidal [Insomnia] : insomnia [Anxiety] : no anxiety [Depression] : depression [Negative] : Gastrointestinal

## 2019-03-28 NOTE — PHYSICAL EXAM
[No Acute Distress] : no acute distress [Well Nourished] : well nourished [Well Developed] : well developed [Normal Voice/Communication] : normal voice/communication [Normal Sclera/Conjunctiva] : normal sclera/conjunctiva [No Respiratory Distress] : no respiratory distress  [Clear to Auscultation] : lungs were clear to auscultation bilaterally [No Accessory Muscle Use] : no accessory muscle use [Normal Rate] : normal rate  [Regular Rhythm] : with a regular rhythm [Normal S1, S2] : normal S1 and S2 [Soft] : abdomen soft [No HSM] : no HSM [Normal Bowel Sounds] : normal bowel sounds [Normal Supraclavicular Nodes] : no supraclavicular lymphadenopathy [Normal Axillary Nodes] : no axillary lymphadenopathy [Normal Anterior Cervical Nodes] : no anterior cervical lymphadenopathy [No CVA Tenderness] : no CVA  tenderness [No Spinal Tenderness] : no spinal tenderness

## 2019-03-28 NOTE — PLAN
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  Depression\par No active plan\par Follow-up in 1-2 weeks\par Start Wellbutrin  once a day\par \par 2.  Chronic insomnia\par Start Ambien 10 mg\par As needed\par Counseled on sleep hygiene

## 2019-03-30 LAB
HSV 1+2 IGG SER IA-IMP: NEGATIVE
HSV 1+2 IGG SER IA-IMP: NEGATIVE
HSV+VZV DNA SPEC QL NAA+PROBE: NOT DETECTED
HSV1 IGG SER QL: 0.04 INDEX
HSV2 IGG SER QL: 0.06 INDEX
SPECIMEN SOURCE: NORMAL

## 2019-03-31 LAB
25(OH)D3 SERPL-MCNC: 17.3 NG/ML
ALBUMIN SERPL ELPH-MCNC: 4.1 G/DL
ALP BLD-CCNC: 85 U/L
ALT SERPL-CCNC: 46 U/L
ANION GAP SERPL CALC-SCNC: 12 MMOL/L
AST SERPL-CCNC: 29 U/L
BASOPHILS # BLD AUTO: 0.01 K/UL
BASOPHILS NFR BLD AUTO: 0.1 %
BILIRUB SERPL-MCNC: 0.4 MG/DL
BUN SERPL-MCNC: 11 MG/DL
CALCIUM SERPL-MCNC: 9.5 MG/DL
CHLORIDE SERPL-SCNC: 103 MMOL/L
CHOLEST SERPL-MCNC: 172 MG/DL
CHOLEST/HDLC SERPL: 4.3 RATIO
CO2 SERPL-SCNC: 24 MMOL/L
CREAT SERPL-MCNC: 0.9 MG/DL
EOSINOPHIL # BLD AUTO: 0.14 K/UL
EOSINOPHIL NFR BLD AUTO: 1.8 %
FOLATE SERPL-MCNC: 9.4 NG/ML
FSH SERPL-MCNC: 2.9 IU/L
GLUCOSE SERPL-MCNC: 101 MG/DL
HBA1C MFR BLD HPLC: 5.4 %
HCT VFR BLD CALC: 48.5 %
HCV AB SER QL: NONREACTIVE
HCV S/CO RATIO: 0.07 S/CO
HDLC SERPL-MCNC: 40 MG/DL
HGB BLD-MCNC: 14.8 G/DL
HIV1+2 AB SPEC QL IA.RAPID: NONREACTIVE
IMM GRANULOCYTES NFR BLD AUTO: 0.5 %
LDLC SERPL CALC-MCNC: 96 MG/DL
LH SERPL-ACNC: 7.4 IU/L
LYMPHOCYTES # BLD AUTO: 2.56 K/UL
LYMPHOCYTES NFR BLD AUTO: 32 %
MAN DIFF?: NORMAL
MCHC RBC-ENTMCNC: 30.5 GM/DL
MCHC RBC-ENTMCNC: 31.4 PG
MCV RBC AUTO: 102.8 FL
MEV IGG FLD QL IA: 166 AU/ML
MEV IGG+IGM SER-IMP: POSITIVE
MONOCYTES # BLD AUTO: 0.57 K/UL
MONOCYTES NFR BLD AUTO: 7.1 %
MUV AB SER-ACNC: POSITIVE
MUV IGG SER QL IA: 30.4 AU/ML
NEUTROPHILS # BLD AUTO: 4.67 K/UL
NEUTROPHILS NFR BLD AUTO: 58.5 %
PLATELET # BLD AUTO: 302 K/UL
POTASSIUM SERPL-SCNC: 4.1 MMOL/L
PROLACTIN SERPL-MCNC: 15.6 NG/ML
PROT SERPL-MCNC: 7 G/DL
PSA FREE FLD-MCNC: 24 %
PSA FREE SERPL-MCNC: 0.2 NG/ML
PSA SERPL-MCNC: 0.84 NG/ML
RBC # BLD: 4.72 M/UL
RBC # FLD: 12.7 %
SODIUM SERPL-SCNC: 139 MMOL/L
TRIGL SERPL-MCNC: 178 MG/DL
TSH SERPL-ACNC: 0.8 UIU/ML
URATE SERPL-MCNC: 7.7 MG/DL
VIT B12 SERPL-MCNC: 470 PG/ML
VZV AB TITR SER: POSITIVE
VZV IGG SER IF-ACNC: >4000 INDEX
WBC # FLD AUTO: 7.99 K/UL

## 2019-04-01 LAB
HERPES SIMPLEX 1 DNA: NOT DETECTED
HERPES SIMPLEX 2 DNA: NOT DETECTED
HERPES SIMPLEX SPECIMEN SOURCE: NORMAL

## 2019-04-03 LAB
HSV1 IGM SER QL: NORMAL TITER
HSV2 AB FLD-ACNC: NORMAL TITER

## 2019-04-11 ENCOUNTER — RX RENEWAL (OUTPATIENT)
Age: 34
End: 2019-04-11

## 2019-05-02 ENCOUNTER — APPOINTMENT (OUTPATIENT)
Dept: INTERNAL MEDICINE | Facility: CLINIC | Age: 34
End: 2019-05-02
Payer: COMMERCIAL

## 2019-05-02 VITALS — HEIGHT: 70 IN | WEIGHT: 207 LBS | BODY MASS INDEX: 29.63 KG/M2

## 2019-05-02 VITALS — HEART RATE: 69 BPM | DIASTOLIC BLOOD PRESSURE: 56 MMHG | SYSTOLIC BLOOD PRESSURE: 119 MMHG

## 2019-05-02 DIAGNOSIS — R29.898 OTHER SYMPTOMS AND SIGNS INVOLVING THE MUSCULOSKELETAL SYSTEM: ICD-10-CM

## 2019-05-02 PROCEDURE — 99203 OFFICE O/P NEW LOW 30 MIN: CPT

## 2019-05-02 NOTE — ASSESSMENT
[FreeTextEntry1] : 1) Cervical radiculopathy: Symptoms and Spurling maneuver positive consistent with radiculopathy, possibly C-4, C-5, C-6. Given neurologic deficits patient to have cervical MRI. Will start patient high dose prednisone taper and baclofen as a muscle relaxant. Side effects of medication addressed to patient who understood.

## 2019-05-02 NOTE — PHYSICAL EXAM
[No Acute Distress] : no acute distress [Well Nourished] : well nourished [Well Developed] : well developed [No Joint Swelling] : no joint swelling [Well-Appearing] : well-appearing [Normal Gait] : normal gait [Coordination Grossly Intact] : coordination grossly intact [de-identified] : decreased sensation in the radial aspect of the right hand [de-identified] : Left sided cervical muscle spasm [de-identified] :  strength 5/5. Right sided ulnar strength 4/5. Decreased sensation of the radial aspect of the left hand. Spurling maneuver positive. Shoulder abduction test negative

## 2019-05-02 NOTE — HISTORY OF PRESENT ILLNESS
[FreeTextEntry8] : 33 year old male with past medical history of C5-C6 herniation presents with a one week history of neck pain radiating down to the left hand. Patient lifted an object about a week ago and felt sharp excruciating pain in the upper left side of the neck. Patient has history of paresthesias in the right hand and has now developed paresthesias in the left hand. Patient states pain radiates to the top of the head. Patient feels slight weakness in the left hand. Patient unable to sleep secondary to pain, NSAID's provide minimal relief. Went to chiropractor who found area of muscle spasm. Patient denies any urinary incontinence. Pain wakes patient from sleep.

## 2019-05-13 ENCOUNTER — APPOINTMENT (OUTPATIENT)
Dept: INTERNAL MEDICINE | Facility: CLINIC | Age: 34
End: 2019-05-13
Payer: COMMERCIAL

## 2019-05-13 PROCEDURE — 36415 COLL VENOUS BLD VENIPUNCTURE: CPT

## 2019-05-14 LAB
CARDIOLIPIN AB SER IA-ACNC: POSITIVE
CONFIRM: 25.6 SEC
DRVVT IMM 1:2 NP PPP: NORMAL
DRVVT SCREEN TO CONFIRM RATIO: 0.97 RATIO
HCYS SERPL-MCNC: 9.1 UMOL/L
SCREEN DRVVT: 31.1 SEC

## 2019-05-15 ENCOUNTER — APPOINTMENT (OUTPATIENT)
Dept: PHYSICAL MEDICINE AND REHAB | Facility: CLINIC | Age: 34
End: 2019-05-15
Payer: COMMERCIAL

## 2019-05-15 VITALS
OXYGEN SATURATION: 98 % | SYSTOLIC BLOOD PRESSURE: 110 MMHG | TEMPERATURE: 98.5 F | HEART RATE: 85 BPM | DIASTOLIC BLOOD PRESSURE: 72 MMHG

## 2019-05-15 DIAGNOSIS — M79.602 PARESTHESIA OF SKIN: ICD-10-CM

## 2019-05-15 DIAGNOSIS — M79.601 PARESTHESIA OF SKIN: ICD-10-CM

## 2019-05-15 DIAGNOSIS — M54.12 RADICULOPATHY, CERVICAL REGION: ICD-10-CM

## 2019-05-15 DIAGNOSIS — R20.2 PARESTHESIA OF SKIN: ICD-10-CM

## 2019-05-15 LAB
B2 GLYCOPROT1 AB SER QL: NEGATIVE
B2 GLYCOPROT1 IGA SERPL IA-ACNC: <5 SAU
CARDIOLIPIN IGM SER-MCNC: 15.2 GPL
CARDIOLIPIN IGM SER-MCNC: 7.8 MPL

## 2019-05-15 PROCEDURE — 99204 OFFICE O/P NEW MOD 45 MIN: CPT

## 2019-05-15 NOTE — HISTORY OF PRESENT ILLNESS
[FreeTextEntry1] : This is BECKIE METZ,  a pleasant 33 year-old male here for initial evaluation. \par \par He has been having neck pain - started 2w ago, he was in a sitting position and was trying to lift something heavy, and he had an extreme pain on left side of the neck and went up to the back of the head, he had to lay down flat, "worst pain I've ever experienced in my life."  Since then, he has been going to chirpractor, tried heat, Baclofen rx'd by PCP.   Left UE experienced transient tingling to D1-2. \par \par He had an initial injury in 9/2018, was trying to get something from floor, on way up felt sharp pain at the neck.  Went to another chirpractor.  Had MRI.  C5-6 disc protrusion w/ annular fissure.\par \par His PCP ordered brachial plexus and cervical MRI. \par  \par Since 2012 he states he has had an ulnar neuropathy - he had 2 EMGs, 1st EMG showed "plexitis," 2nd neurologist told him it was an "ulnar neuropathy" sent for a dynamic US, and radiologist said he did not think it was an ulnar neuropathy.  \par \par He went to [facility], was looked at briefly.  Had PT/OT, in 2013.  \par Pain Description:\par Pain Scale: 2-9/10\par Relieving factors include rest.  \par Bowel/Bladder Incontinence: Denies\par Lower and Upper extremity weakness: Right upper limb - finds D5 weak. \par

## 2019-05-15 NOTE — PHYSICAL EXAM
[FreeTextEntry1] : General:  Awake and alert in no acute distress, \par Psych: normal mood and affect. \par HEENT: NC/AT, normal visual tracking\par Pulmonary: no resp distress, chest expansion appears symmetrical\par CV: extremities are warm and perfused\par Abd: non-distended\par Ext: no c/c/e\par \par \par CERVICAL SPINE REGION:\par Inspection, cervical: normal, no listing of the head, no gross asymmetries.\par \par Active ROM of the cervical spine: (Estimated range):  \par Full ROM w/ pain on left with rightward flexion and rotation.\par -ve Lhermitte's sign\par 		\par Palpation:  Tender at Cervical paraspinals, left worse than right.\par 		\par Reflexes: Upper limbs:		RIGHT	   LEFT	\par Biceps	C5-6		                2+	    2+\par Brachioradialis	C5-6		2+                2+\par Triceps	C(6)7-8(1)		2+	    2+\par 		\par Strength, upper limbs: 	\par 5/5 in SA, EF, EE, WE, ADM, APB bilaterally except right D4-5 FDP 4/5 compared to left (patient right handed)\par 		\par Sensation: Upper limbs:\par Intact to pinprick over C3-T1 bilateral UE dermatomes \par 		\par Tests for cervical radiculopathy/myelopathy: 	\par Spurling’s sign: negative bilaterally\par \par Brief US of right ulnar nerve: no gross abnormalities, no swelling, enlargement of nerve, no visible constrictions.  no subluxations.\par

## 2019-05-15 NOTE — ASSESSMENT
[FreeTextEntry1] : This is BECKIE METZ,  a 33 year-old male here for initial evaluation of neck pain:\par \par 1.  Cannot rule out right C5-6 radiculopathy 2/2 disc protrusion/herniation.  Last MRI shows the same, but he had acute worsening of pain 2w ago.\par 2.  Possible ulnar neuropathy on right, EMG testing done twice previously not available today.  \par \par Plan:\par 1.  Will follow-up with patient after reviewing the new MRIs (cervical and brachial plexus) that his PCP ordered.\par 2.  If needed, can repeat EMG - would like to compare to old values from prior report, asked patient to bring those with him.\par 3.  Cont. Baclofen PRN\par \par Medications have been discussed and reconciled, adverse reactions and side effects have been reviewed.\par \par \par

## 2019-05-18 ENCOUNTER — OUTPATIENT (OUTPATIENT)
Dept: OUTPATIENT SERVICES | Facility: HOSPITAL | Age: 34
LOS: 1 days | End: 2019-05-18

## 2019-05-18 DIAGNOSIS — M54.12 RADICULOPATHY, CERVICAL REGION: ICD-10-CM

## 2019-05-22 ENCOUNTER — APPOINTMENT (OUTPATIENT)
Dept: PHYSICAL MEDICINE AND REHAB | Facility: CLINIC | Age: 34
End: 2019-05-22

## 2019-05-23 LAB
APPEARANCE: CLEAR
BILIRUBIN URINE: NEGATIVE
BLOOD URINE: NEGATIVE
COLOR: YELLOW
GLUCOSE QUALITATIVE U: NEGATIVE
KETONES URINE: NEGATIVE
LEUKOCYTE ESTERASE URINE: NEGATIVE
NITRITE URINE: NEGATIVE
PH URINE: 6
PROTEIN URINE: NORMAL
SPECIFIC GRAVITY URINE: 1.02
UROBILINOGEN URINE: NORMAL

## 2019-05-29 ENCOUNTER — APPOINTMENT (OUTPATIENT)
Dept: PHYSICAL MEDICINE AND REHAB | Facility: CLINIC | Age: 34
End: 2019-05-29

## 2019-05-30 ENCOUNTER — TRANSCRIPTION ENCOUNTER (OUTPATIENT)
Age: 34
End: 2019-05-30

## 2019-06-05 ENCOUNTER — FORM ENCOUNTER (OUTPATIENT)
Age: 34
End: 2019-06-05

## 2019-06-06 ENCOUNTER — APPOINTMENT (OUTPATIENT)
Dept: MRI IMAGING | Facility: CLINIC | Age: 34
End: 2019-06-06
Payer: COMMERCIAL

## 2019-06-06 ENCOUNTER — RX RENEWAL (OUTPATIENT)
Age: 34
End: 2019-06-06

## 2019-06-06 ENCOUNTER — OUTPATIENT (OUTPATIENT)
Dept: OUTPATIENT SERVICES | Facility: HOSPITAL | Age: 34
LOS: 1 days | End: 2019-06-06
Payer: COMMERCIAL

## 2019-06-06 DIAGNOSIS — M54.12 RADICULOPATHY, CERVICAL REGION: ICD-10-CM

## 2019-06-06 PROCEDURE — 72141 MRI NECK SPINE W/O DYE: CPT

## 2019-06-06 PROCEDURE — 71550 MRI CHEST W/O DYE: CPT

## 2019-06-06 PROCEDURE — 71550 MRI CHEST W/O DYE: CPT | Mod: 26

## 2019-06-06 PROCEDURE — 72141 MRI NECK SPINE W/O DYE: CPT | Mod: 26

## 2019-06-11 ENCOUNTER — APPOINTMENT (OUTPATIENT)
Dept: INTERNAL MEDICINE | Facility: CLINIC | Age: 34
End: 2019-06-11
Payer: COMMERCIAL

## 2019-06-11 DIAGNOSIS — G56.21 LESION OF ULNAR NERVE, RIGHT UPPER LIMB: ICD-10-CM

## 2019-06-11 DIAGNOSIS — Z87.898 PERSONAL HISTORY OF OTHER SPECIFIED CONDITIONS: ICD-10-CM

## 2019-06-11 DIAGNOSIS — R79.89 OTHER SPECIFIED ABNORMAL FINDINGS OF BLOOD CHEMISTRY: ICD-10-CM

## 2019-06-11 DIAGNOSIS — N50.811 RIGHT TESTICULAR PAIN: ICD-10-CM

## 2019-06-11 DIAGNOSIS — Z86.39 PERSONAL HISTORY OF OTHER ENDOCRINE, NUTRITIONAL AND METABOLIC DISEASE: ICD-10-CM

## 2019-06-11 DIAGNOSIS — E72.12 METHYLENETETRAHYDROFOLATE REDUCTASE DEFICIENCY: ICD-10-CM

## 2019-06-11 DIAGNOSIS — Z87.19 PERSONAL HISTORY OF OTHER DISEASES OF THE DIGESTIVE SYSTEM: ICD-10-CM

## 2019-06-11 PROCEDURE — 96372 THER/PROPH/DIAG INJ SC/IM: CPT

## 2019-07-03 ENCOUNTER — APPOINTMENT (OUTPATIENT)
Dept: INTERNAL MEDICINE | Facility: CLINIC | Age: 34
End: 2019-07-03
Payer: COMMERCIAL

## 2019-07-03 PROCEDURE — 96372 THER/PROPH/DIAG INJ SC/IM: CPT

## 2019-07-03 RX ORDER — CYANOCOBALAMIN 1000 UG/ML
1000 INJECTION INTRAMUSCULAR; SUBCUTANEOUS
Qty: 0 | Refills: 0 | Status: COMPLETED | OUTPATIENT
Start: 2019-07-03

## 2019-07-03 RX ADMIN — CYANOCOBALAMIN 0 MCG/ML: 1000 INJECTION INTRAMUSCULAR; SUBCUTANEOUS at 00:00

## 2019-07-07 PROBLEM — K90.9 MALABSORPTION SYNDROME: Status: ACTIVE | Noted: 2017-05-30

## 2019-07-11 ENCOUNTER — APPOINTMENT (OUTPATIENT)
Dept: INTERNAL MEDICINE | Facility: CLINIC | Age: 34
End: 2019-07-11
Payer: COMMERCIAL

## 2019-07-11 PROCEDURE — 96372 THER/PROPH/DIAG INJ SC/IM: CPT

## 2019-07-11 RX ORDER — CYANOCOBALAMIN 1000 UG/ML
1000 INJECTION INTRAMUSCULAR; SUBCUTANEOUS
Qty: 0 | Refills: 0 | Status: COMPLETED | OUTPATIENT
Start: 2019-07-11

## 2019-07-11 RX ADMIN — CYANOCOBALAMIN 0 MCG/ML: 1000 INJECTION INTRAMUSCULAR; SUBCUTANEOUS at 00:00

## 2019-07-30 DIAGNOSIS — R93.89 ABNORMAL FINDINGS ON DIAGNOSTIC IMAGING OF OTHER SPECIFIED BODY STRUCTURES: ICD-10-CM

## 2019-07-30 RX ORDER — PREDNISONE 20 MG/1
20 TABLET ORAL
Qty: 27 | Refills: 0 | Status: COMPLETED | COMMUNITY
Start: 2019-05-02 | End: 2019-07-30

## 2019-07-30 RX ORDER — FLUTICASONE PROPIONATE 110 UG/1
110 AEROSOL, METERED RESPIRATORY (INHALATION) TWICE DAILY
Qty: 1 | Refills: 3 | Status: COMPLETED | COMMUNITY
Start: 2018-10-29 | End: 2019-07-30

## 2019-07-30 RX ORDER — SODIUM CHLORIDE 0.65 %
0.65 AEROSOL, SPRAY (ML) NASAL TWICE DAILY
Qty: 1 | Refills: 2 | Status: COMPLETED | COMMUNITY
Start: 2018-03-26 | End: 2019-07-30

## 2019-08-08 ENCOUNTER — APPOINTMENT (OUTPATIENT)
Dept: INTERNAL MEDICINE | Facility: CLINIC | Age: 34
End: 2019-08-08
Payer: COMMERCIAL

## 2019-08-08 PROCEDURE — 96372 THER/PROPH/DIAG INJ SC/IM: CPT

## 2019-08-08 RX ORDER — CYANOCOBALAMIN 1000 UG/ML
1000 INJECTION INTRAMUSCULAR; SUBCUTANEOUS
Qty: 0 | Refills: 0 | Status: COMPLETED | OUTPATIENT
Start: 2019-08-08

## 2019-08-08 RX ADMIN — CYANOCOBALAMIN 0 MCG/ML: 1000 INJECTION INTRAMUSCULAR; SUBCUTANEOUS at 00:00

## 2019-09-11 ENCOUNTER — APPOINTMENT (OUTPATIENT)
Dept: INTERNAL MEDICINE | Facility: CLINIC | Age: 34
End: 2019-09-11
Payer: COMMERCIAL

## 2019-09-11 PROCEDURE — 90686 IIV4 VACC NO PRSV 0.5 ML IM: CPT

## 2019-09-11 PROCEDURE — G0008: CPT

## 2019-09-19 ENCOUNTER — APPOINTMENT (OUTPATIENT)
Dept: INTERNAL MEDICINE | Facility: CLINIC | Age: 34
End: 2019-09-19
Payer: COMMERCIAL

## 2019-09-19 VITALS
BODY MASS INDEX: 28.56 KG/M2 | WEIGHT: 199.51 LBS | TEMPERATURE: 98.3 F | HEIGHT: 70.08 IN | OXYGEN SATURATION: 99 % | SYSTOLIC BLOOD PRESSURE: 108 MMHG | HEART RATE: 62 BPM | DIASTOLIC BLOOD PRESSURE: 58 MMHG

## 2019-09-19 DIAGNOSIS — F32.9 MAJOR DEPRESSIVE DISORDER, SINGLE EPISODE, UNSPECIFIED: ICD-10-CM

## 2019-09-19 PROCEDURE — 99213 OFFICE O/P EST LOW 20 MIN: CPT

## 2019-09-19 RX ORDER — BUPROPION HYDROCHLORIDE 150 MG/1
150 TABLET, EXTENDED RELEASE ORAL
Qty: 90 | Refills: 1 | Status: DISCONTINUED | COMMUNITY
Start: 2019-03-28 | End: 2019-09-19

## 2019-09-20 NOTE — HISTORY OF PRESENT ILLNESS
[FreeTextEntry8] : Patient presents to the office because of decreased mood and insomnia. Patient has some personal circumstances. Denies any thoughts of self harm. Denies any alcohol or drug use. Planning on seeing therapist again. Sleeps on average 3 hours a night. Was briefly on zolpidem which helped with insomnia. Insomnia secondary to anxiety and low mood.

## 2019-09-20 NOTE — ASSESSMENT
[FreeTextEntry1] : 1) Depression: Will increase wellbutrin to 300 mg ER. Reassess in 4-6 weeks. If no improvement, consider switching to SSRI or augmentation with SSRI\par 2) insomnia: add zolpidem 5 mg. Reassess in 4 weeks. Patient aware of side effects. If no response increase to 10 mg.

## 2019-09-30 LAB
25(OH)D3 SERPL-MCNC: 17.9 NG/ML
ALBUMIN SERPL ELPH-MCNC: 4.7 G/DL
ALP BLD-CCNC: 94 U/L
ALT SERPL-CCNC: 23 U/L
ANION GAP SERPL CALC-SCNC: 11 MMOL/L
APPEARANCE: CLEAR
AST SERPL-CCNC: 16 U/L
BILIRUB SERPL-MCNC: 0.5 MG/DL
BILIRUBIN URINE: NEGATIVE
BLOOD URINE: NEGATIVE
BUN SERPL-MCNC: 12 MG/DL
C TRACH RRNA SPEC QL NAA+PROBE: NOT DETECTED
CALCIUM SERPL-MCNC: 9.5 MG/DL
CHLORIDE SERPL-SCNC: 101 MMOL/L
CHOLEST SERPL-MCNC: 210 MG/DL
CHOLEST/HDLC SERPL: 3.8 RATIO
CO2 SERPL-SCNC: 26 MMOL/L
COLOR: NORMAL
CREAT SERPL-MCNC: 0.8 MG/DL
ESTIMATED AVERAGE GLUCOSE: 111 MG/DL
FOLATE SERPL-MCNC: 10.1 NG/ML
GLUCOSE QUALITATIVE U: NEGATIVE
GLUCOSE SERPL-MCNC: 106 MG/DL
HAV IGM SER QL: NONREACTIVE
HBA1C MFR BLD HPLC: 5.5 %
HBV CORE IGM SER QL: NONREACTIVE
HBV SURFACE AG SER QL: NONREACTIVE
HCV AB SER QL: NONREACTIVE
HCV S/CO RATIO: 0.08 S/CO
HDLC SERPL-MCNC: 55 MG/DL
HIV1+2 AB SPEC QL IA.RAPID: NONREACTIVE
KETONES URINE: NEGATIVE
LDLC SERPL CALC-MCNC: 123 MG/DL
LEUKOCYTE ESTERASE URINE: NEGATIVE
N GONORRHOEA RRNA SPEC QL NAA+PROBE: NOT DETECTED
NITRITE URINE: NEGATIVE
PH URINE: 7.5
POTASSIUM SERPL-SCNC: 4.2 MMOL/L
PROT SERPL-MCNC: 6.7 G/DL
PROTEIN URINE: NEGATIVE
SODIUM SERPL-SCNC: 138 MMOL/L
SOURCE AMPLIFICATION: NORMAL
SPECIFIC GRAVITY URINE: 1.02
T PALLIDUM AB SER QL IA: NEGATIVE
TRIGL SERPL-MCNC: 159 MG/DL
UROBILINOGEN URINE: NORMAL
VIT B12 SERPL-MCNC: 451 PG/ML

## 2019-10-01 LAB
TESTOST BND SERPL-MCNC: 11.6 PG/ML
TESTOST SERPL-MCNC: 302.8 NG/DL

## 2019-10-21 ENCOUNTER — RX RENEWAL (OUTPATIENT)
Age: 34
End: 2019-10-21

## 2019-10-31 ENCOUNTER — LABORATORY RESULT (OUTPATIENT)
Age: 34
End: 2019-10-31

## 2019-11-01 ENCOUNTER — APPOINTMENT (OUTPATIENT)
Dept: INTERNAL MEDICINE | Facility: CLINIC | Age: 34
End: 2019-11-01
Payer: COMMERCIAL

## 2019-11-01 ENCOUNTER — RX RENEWAL (OUTPATIENT)
Age: 34
End: 2019-11-01

## 2019-11-01 DIAGNOSIS — R53.83 OTHER FATIGUE: ICD-10-CM

## 2019-11-01 PROCEDURE — 99213 OFFICE O/P EST LOW 20 MIN: CPT | Mod: 25

## 2019-11-01 PROCEDURE — 36415 COLL VENOUS BLD VENIPUNCTURE: CPT

## 2019-11-01 RX ORDER — ERGOCALCIFEROL 1.25 MG/1
1.25 MG CAPSULE, LIQUID FILLED ORAL
Qty: 12 | Refills: 3 | Status: ACTIVE | COMMUNITY
Start: 2019-11-01 | End: 1900-01-01

## 2019-11-04 PROBLEM — R53.83 FATIGUE: Status: ACTIVE | Noted: 2018-11-26

## 2019-11-04 NOTE — HISTORY OF PRESENT ILLNESS
[FreeTextEntry8] : Patient states he feels weakness over the past week. Has been battling URI recently. Denies any fevers, chills, diarrhea, nausea or vomiting.

## 2019-12-02 ENCOUNTER — RX RENEWAL (OUTPATIENT)
Age: 34
End: 2019-12-02

## 2019-12-02 ENCOUNTER — OTHER (OUTPATIENT)
Age: 34
End: 2019-12-02

## 2019-12-02 RX ORDER — ZOLPIDEM TARTRATE 10 MG/1
10 TABLET ORAL
Qty: 5 | Refills: 1 | Status: COMPLETED | COMMUNITY
Start: 2019-03-28 | End: 2019-12-02

## 2019-12-09 LAB
M TB IFN-G BLD-IMP: NEGATIVE
QUANTIFERON TB PLUS MITOGEN MINUS NIL: 8.14 IU/ML
QUANTIFERON TB PLUS NIL: 0.02 IU/ML
QUANTIFERON TB PLUS TB1 MINUS NIL: 0 IU/ML
QUANTIFERON TB PLUS TB2 MINUS NIL: 0 IU/ML

## 2019-12-12 ENCOUNTER — RX RENEWAL (OUTPATIENT)
Age: 34
End: 2019-12-12

## 2020-01-08 ENCOUNTER — APPOINTMENT (OUTPATIENT)
Dept: INTERNAL MEDICINE | Facility: CLINIC | Age: 35
End: 2020-01-08
Payer: COMMERCIAL

## 2020-01-08 DIAGNOSIS — E53.8 DEFICIENCY OF OTHER SPECIFIED B GROUP VITAMINS: ICD-10-CM

## 2020-01-08 DIAGNOSIS — E72.12 METHYLENETETRAHYDROFOLATE REDUCTASE DEFICIENCY: ICD-10-CM

## 2020-01-08 DIAGNOSIS — E55.9 VITAMIN D DEFICIENCY, UNSPECIFIED: ICD-10-CM

## 2020-01-08 PROCEDURE — 36415 COLL VENOUS BLD VENIPUNCTURE: CPT

## 2020-01-08 PROCEDURE — 87880 STREP A ASSAY W/OPTIC: CPT | Mod: QW

## 2020-01-09 ENCOUNTER — APPOINTMENT (OUTPATIENT)
Dept: INTERNAL MEDICINE | Facility: CLINIC | Age: 35
End: 2020-01-09
Payer: COMMERCIAL

## 2020-01-09 ENCOUNTER — OTHER (OUTPATIENT)
Age: 35
End: 2020-01-09

## 2020-01-09 DIAGNOSIS — M25.562 PAIN IN LEFT KNEE: ICD-10-CM

## 2020-01-09 DIAGNOSIS — Z87.09 PERSONAL HISTORY OF OTHER DISEASES OF THE RESPIRATORY SYSTEM: ICD-10-CM

## 2020-01-09 PROBLEM — E55.9 VITAMIN D DEFICIENCY: Status: ACTIVE | Noted: 2017-06-01

## 2020-01-09 PROBLEM — E53.8 LOW VITAMIN B12 LEVEL: Status: ACTIVE | Noted: 2019-06-11

## 2020-01-09 PROBLEM — E72.12 HETEROZYGOUS MTHFR MUTATION C677T: Status: ACTIVE | Noted: 2017-05-30

## 2020-01-09 PROCEDURE — 99213 OFFICE O/P EST LOW 20 MIN: CPT

## 2020-01-10 VITALS
RESPIRATION RATE: 14 BRPM | TEMPERATURE: 98.8 F | HEIGHT: 70 IN | DIASTOLIC BLOOD PRESSURE: 66 MMHG | HEART RATE: 68 BPM | OXYGEN SATURATION: 99 % | BODY MASS INDEX: 28.35 KG/M2 | SYSTOLIC BLOOD PRESSURE: 110 MMHG | WEIGHT: 198 LBS

## 2020-01-10 PROBLEM — M25.562 KNEE PAIN, LEFT: Status: ACTIVE | Noted: 2020-01-10

## 2020-01-10 NOTE — HISTORY OF PRESENT ILLNESS
[FreeTextEntry8] : Patient presents to the office because of sore throat of 3 days duration. Patient also has chronic left-sided knee pain. Patient had 2 previous surgeries and has aching pain difficulty with weight bearing. Denies any overlying erythema. Symptoms associated with catching clicking. Feels unstable when walking. Denies any fevers chills shortness of breath.

## 2020-01-10 NOTE — ASSESSMENT
[FreeTextEntry1] : 1) Pharyngitis: await culture results, given persistent symptoms and pustule send amoxicillin\par 2) Knee pain: Has appointment with orthopedist, obtain Xray r/o meniscal pathology

## 2020-01-10 NOTE — PHYSICAL EXAM
[Normal] : normal gait, coordination grossly intact, no focal deficits and deep tendon reflexes were 2+ and symmetric [de-identified] : pustules on left adenoid [de-identified] : tenderness on medial and lateral joint lines

## 2020-01-13 ENCOUNTER — APPOINTMENT (OUTPATIENT)
Dept: ORTHOPEDIC SURGERY | Facility: CLINIC | Age: 35
End: 2020-01-13

## 2020-01-13 LAB
BACTERIA THROAT CULT: NORMAL
M TB IFN-G BLD-IMP: NEGATIVE
QUANTIFERON TB PLUS MITOGEN MINUS NIL: 8.81 IU/ML
QUANTIFERON TB PLUS NIL: 0.01 IU/ML
QUANTIFERON TB PLUS TB1 MINUS NIL: 0 IU/ML
QUANTIFERON TB PLUS TB2 MINUS NIL: 0 IU/ML

## 2020-01-17 ENCOUNTER — FORM ENCOUNTER (OUTPATIENT)
Age: 35
End: 2020-01-17

## 2020-01-18 ENCOUNTER — APPOINTMENT (OUTPATIENT)
Dept: MRI IMAGING | Facility: IMAGING CENTER | Age: 35
End: 2020-01-18
Payer: COMMERCIAL

## 2020-01-18 ENCOUNTER — OUTPATIENT (OUTPATIENT)
Dept: OUTPATIENT SERVICES | Facility: HOSPITAL | Age: 35
LOS: 1 days | End: 2020-01-18
Payer: COMMERCIAL

## 2020-01-18 DIAGNOSIS — Z00.00 ENCOUNTER FOR GENERAL ADULT MEDICAL EXAMINATION WITHOUT ABNORMAL FINDINGS: ICD-10-CM

## 2020-01-18 PROCEDURE — 73721 MRI JNT OF LWR EXTRE W/O DYE: CPT | Mod: 26,LT

## 2020-01-18 PROCEDURE — 73721 MRI JNT OF LWR EXTRE W/O DYE: CPT

## 2020-01-24 RX ORDER — BACLOFEN 10 MG/1
10 TABLET ORAL 3 TIMES DAILY
Qty: 30 | Refills: 0 | Status: COMPLETED | COMMUNITY
Start: 2019-05-02 | End: 2020-01-24

## 2020-01-24 RX ORDER — AMOXICILLIN 500 MG/1
500 TABLET, FILM COATED ORAL
Qty: 14 | Refills: 0 | Status: COMPLETED | COMMUNITY
Start: 2020-01-09 | End: 2020-01-24

## 2020-01-31 LAB
HCV AB SER QL: NONREACTIVE
HCV S/CO RATIO: 0.1 S/CO
HIV1+2 AB SPEC QL IA.RAPID: NONREACTIVE
T PALLIDUM AB SER QL IA: NEGATIVE

## 2020-04-26 ENCOUNTER — MESSAGE (OUTPATIENT)
Age: 35
End: 2020-04-26

## 2020-05-08 DIAGNOSIS — Z03.818 ENCOUNTER FOR OBSERVATION FOR SUSPECTED EXPOSURE TO OTHER BIOLOGICAL AGENTS RULED OUT: ICD-10-CM

## 2020-05-19 LAB
SARS-COV-2 IGG SERPL IA-ACNC: 0.1 INDEX
SARS-COV-2 IGG SERPL QL IA: NEGATIVE

## 2020-07-10 DIAGNOSIS — Z01.818 ENCOUNTER FOR OTHER PREPROCEDURAL EXAMINATION: ICD-10-CM

## 2020-07-21 DIAGNOSIS — Z20.828 CONTACT WITH AND (SUSPECTED) EXPOSURE TO OTHER VIRAL COMMUNICABLE DISEASES: ICD-10-CM

## 2020-07-22 LAB
HIV1+2 AB SPEC QL IA.RAPID: NONREACTIVE
SARS-COV-2 IGG SERPL IA-ACNC: <3.8 AU/ML
SARS-COV-2 IGG SERPL QL IA: NEGATIVE
SARS-COV-2 N GENE NPH QL NAA+PROBE: NOT DETECTED
T PALLIDUM AB SER QL IA: NEGATIVE

## 2020-08-28 DIAGNOSIS — Z87.898 PERSONAL HISTORY OF OTHER SPECIFIED CONDITIONS: ICD-10-CM

## 2020-08-28 DIAGNOSIS — N52.9 MALE ERECTILE DYSFUNCTION, UNSPECIFIED: ICD-10-CM

## 2020-08-28 DIAGNOSIS — G47.00 INSOMNIA, UNSPECIFIED: ICD-10-CM

## 2020-08-28 RX ORDER — ALBUTEROL SULFATE 90 UG/1
108 (90 BASE) AEROSOL, METERED RESPIRATORY (INHALATION)
Qty: 3 | Refills: 3 | Status: ACTIVE | COMMUNITY
Start: 2018-10-29 | End: 1900-01-01

## 2020-08-28 RX ORDER — ZOLPIDEM TARTRATE 10 MG/1
10 TABLET ORAL
Qty: 30 | Refills: 2 | Status: ACTIVE | COMMUNITY
Start: 2019-09-19 | End: 1900-01-01

## 2020-12-11 ENCOUNTER — NON-APPOINTMENT (OUTPATIENT)
Age: 35
End: 2020-12-11

## 2020-12-11 ENCOUNTER — APPOINTMENT (OUTPATIENT)
Dept: INTERNAL MEDICINE | Facility: CLINIC | Age: 35
End: 2020-12-11
Payer: SELF-PAY

## 2020-12-11 DIAGNOSIS — Z23 ENCOUNTER FOR IMMUNIZATION: ICD-10-CM

## 2020-12-11 DIAGNOSIS — Z11.1 ENCOUNTER FOR SCREENING FOR RESPIRATORY TUBERCULOSIS: ICD-10-CM

## 2020-12-11 PROCEDURE — 90682 RIV4 VACC RECOMBINANT DNA IM: CPT

## 2020-12-11 PROCEDURE — G0008: CPT

## 2020-12-14 ENCOUNTER — NON-APPOINTMENT (OUTPATIENT)
Age: 35
End: 2020-12-14

## 2020-12-14 LAB
M TB IFN-G BLD-IMP: NEGATIVE
QUANTIFERON TB PLUS MITOGEN MINUS NIL: 6.99 IU/ML
QUANTIFERON TB PLUS NIL: 0.04 IU/ML
QUANTIFERON TB PLUS TB1 MINUS NIL: -0.02 IU/ML
QUANTIFERON TB PLUS TB2 MINUS NIL: -0.02 IU/ML

## 2020-12-23 PROBLEM — Z87.09 HISTORY OF PHARYNGITIS: Status: RESOLVED | Noted: 2020-01-08 | Resolved: 2020-12-23

## 2021-04-08 ENCOUNTER — RX RENEWAL (OUTPATIENT)
Age: 36
End: 2021-04-08

## 2021-04-08 ENCOUNTER — TRANSCRIPTION ENCOUNTER (OUTPATIENT)
Age: 36
End: 2021-04-08

## 2021-04-08 RX ORDER — PREDNISONE 20 MG/1
20 TABLET ORAL DAILY
Qty: 10 | Refills: 0 | Status: DISCONTINUED | COMMUNITY
Start: 2019-12-02 | End: 2021-04-08

## 2021-04-29 ENCOUNTER — APPOINTMENT (OUTPATIENT)
Dept: INTERNAL MEDICINE | Facility: CLINIC | Age: 36
End: 2021-04-29
Payer: COMMERCIAL

## 2021-04-29 VITALS
SYSTOLIC BLOOD PRESSURE: 94 MMHG | TEMPERATURE: 99.1 F | WEIGHT: 204.02 LBS | OXYGEN SATURATION: 97 % | BODY MASS INDEX: 29.21 KG/M2 | DIASTOLIC BLOOD PRESSURE: 64 MMHG | HEART RATE: 66 BPM | HEIGHT: 70 IN

## 2021-04-29 DIAGNOSIS — Z11.59 ENCOUNTER FOR SCREENING FOR OTHER VIRAL DISEASES: ICD-10-CM

## 2021-04-29 DIAGNOSIS — G44.309 POST-TRAUMATIC HEADACHE, UNSPECIFIED, NOT INTRACTABLE: ICD-10-CM

## 2021-04-29 PROCEDURE — 99214 OFFICE O/P EST MOD 30 MIN: CPT | Mod: 25

## 2021-04-29 PROCEDURE — 99072 ADDL SUPL MATRL&STAF TM PHE: CPT

## 2021-04-29 PROCEDURE — 36415 COLL VENOUS BLD VENIPUNCTURE: CPT

## 2021-04-29 PROCEDURE — 99395 PREV VISIT EST AGE 18-39: CPT | Mod: 25

## 2021-04-29 RX ORDER — GUAIFENESIN AND DEXTROMETHORPHAN 10; 100 MG/5ML; MG/5ML
10-100 SYRUP ORAL
Qty: 1 | Refills: 0 | Status: DISCONTINUED | COMMUNITY
Start: 2020-04-02 | End: 2021-04-29

## 2021-04-29 RX ORDER — PREDNISONE 20 MG/1
20 TABLET ORAL
Qty: 12 | Refills: 0 | Status: DISCONTINUED | COMMUNITY
Start: 2021-04-08 | End: 2021-04-29

## 2021-05-05 LAB
25(OH)D3 SERPL-MCNC: 19.2 NG/ML
ALBUMIN SERPL ELPH-MCNC: 4.6 G/DL
ALP BLD-CCNC: 92 U/L
ALT SERPL-CCNC: 16 U/L
ANION GAP SERPL CALC-SCNC: 12 MMOL/L
APPEARANCE: CLEAR
APPEARANCE: CLEAR
AST SERPL-CCNC: 16 U/L
BACTERIA: NEGATIVE
BASOPHILS # BLD AUTO: 0.01 K/UL
BASOPHILS NFR BLD AUTO: 0.1 %
BILIRUB SERPL-MCNC: 0.4 MG/DL
BILIRUBIN URINE: NEGATIVE
BILIRUBIN URINE: NEGATIVE
BLOOD URINE: NEGATIVE
BLOOD URINE: NEGATIVE
BUN SERPL-MCNC: 16 MG/DL
C TRACH RRNA SPEC QL NAA+PROBE: NOT DETECTED
CALCIUM SERPL-MCNC: 9.3 MG/DL
CHLORIDE SERPL-SCNC: 101 MMOL/L
CHOLEST SERPL-MCNC: 216 MG/DL
CO2 SERPL-SCNC: 24 MMOL/L
COLOR: YELLOW
COLOR: YELLOW
CREAT SERPL-MCNC: 0.92 MG/DL
EOSINOPHIL # BLD AUTO: 0.13 K/UL
EOSINOPHIL NFR BLD AUTO: 1.6 %
ESTIMATED AVERAGE GLUCOSE: 111 MG/DL
GLUCOSE QUALITATIVE U: NEGATIVE
GLUCOSE QUALITATIVE U: NEGATIVE
GLUCOSE SERPL-MCNC: 99 MG/DL
HAV IGM SER QL: NONREACTIVE
HBA1C MFR BLD HPLC: 5.5 %
HBV CORE IGM SER QL: NONREACTIVE
HBV SURFACE AG SER QL: NONREACTIVE
HCT VFR BLD CALC: 45 %
HCV AB SER QL: NONREACTIVE
HCV S/CO RATIO: 0.11 S/CO
HDLC SERPL-MCNC: 46 MG/DL
HGB BLD-MCNC: 14.3 G/DL
HIV1+2 AB SPEC QL IA.RAPID: NONREACTIVE
HYALINE CASTS: 1 /LPF
IMM GRANULOCYTES NFR BLD AUTO: 0.3 %
KETONES URINE: NEGATIVE
KETONES URINE: NEGATIVE
LDLC SERPL CALC-MCNC: 125 MG/DL
LEUKOCYTE ESTERASE URINE: NEGATIVE
LEUKOCYTE ESTERASE URINE: NEGATIVE
LYMPHOCYTES # BLD AUTO: 2.87 K/UL
LYMPHOCYTES NFR BLD AUTO: 36.4 %
MAN DIFF?: NORMAL
MCHC RBC-ENTMCNC: 31.8 GM/DL
MCHC RBC-ENTMCNC: 32.3 PG
MCV RBC AUTO: 101.6 FL
MICROSCOPIC-UA: NORMAL
MONOCYTES # BLD AUTO: 0.6 K/UL
MONOCYTES NFR BLD AUTO: 7.6 %
N GONORRHOEA RRNA SPEC QL NAA+PROBE: NOT DETECTED
NEUTROPHILS # BLD AUTO: 4.25 K/UL
NEUTROPHILS NFR BLD AUTO: 54 %
NITRITE URINE: NEGATIVE
NITRITE URINE: NEGATIVE
NONHDLC SERPL-MCNC: 170 MG/DL
PH URINE: 6
PH URINE: 6
PLATELET # BLD AUTO: 290 K/UL
POTASSIUM SERPL-SCNC: 4.3 MMOL/L
PROT SERPL-MCNC: 7.1 G/DL
PROTEIN URINE: NORMAL
PROTEIN URINE: NORMAL
RBC # BLD: 4.43 M/UL
RBC # FLD: 11.9 %
RED BLOOD CELLS URINE: 2 /HPF
SARS-COV-2 N GENE NPH QL NAA+PROBE: NOT DETECTED
SODIUM SERPL-SCNC: 137 MMOL/L
SOURCE AMPLIFICATION: NORMAL
SPECIFIC GRAVITY URINE: 1.03
SPECIFIC GRAVITY URINE: 1.03
SQUAMOUS EPITHELIAL CELLS: 0 /HPF
T PALLIDUM AB SER QL IA: NEGATIVE
TRIGL SERPL-MCNC: 225 MG/DL
TSH SERPL-ACNC: 0.65 UIU/ML
UROBILINOGEN URINE: NORMAL
UROBILINOGEN URINE: NORMAL
VIT B12 SERPL-MCNC: 395 PG/ML
WBC # FLD AUTO: 7.88 K/UL
WHITE BLOOD CELLS URINE: 1 /HPF

## 2021-05-05 NOTE — HISTORY OF PRESENT ILLNESS
[FreeTextEntry1] : Patient presents to the office for annual physical [de-identified] : Patient presents to the office for annual physical, asthma is currently well controlled does have a headache that has been progressively getting worse.  Hit head about 4 months ago moving furniture with mother's side of head.  Headache is dull in nature sometimes wakes patient up from sleep.  Sometimes associated with nausea.  Headache has been increasing in frequency.  Denies any gait instability neck pain numbness weakness.  Denies any changes in urination has been watching diet.

## 2021-05-05 NOTE — PHYSICAL EXAM
[Urethral Meatus] : meatus normal [Urinary Bladder Findings] : the bladder was normal on palpation [Scrotum] : the scrotum was normal [Rectal Exam - Seminal Vesicles] : the seminal vesicles were normal [Epididymis] : the epididymides were normal [Testes Tenderness] : no tenderness of the testes [Testes Mass (___cm)] : there were no testicular masses [Normal] : normal gait, coordination grossly intact, no focal deficits and deep tendon reflexes were 2+ and symmetric

## 2021-05-05 NOTE — HEALTH RISK ASSESSMENT
[Good] : ~his/her~  mood as  good [FreeTextEntry1] : health maintenance, headache [] : No [No] : No [de-identified] : none [de-identified] : none

## 2021-05-05 NOTE — ASSESSMENT
[FreeTextEntry1] : Blood work done today and Covid swab done, check STI panel lipid panel, given progressively worsening headache headache awakening patient from sleep at night and history of trauma 4 months ago to get MRI of the brain.  Advised if any new neurologic or worsening symptoms to call office immediately.

## 2021-06-04 ENCOUNTER — APPOINTMENT (OUTPATIENT)
Dept: MRI IMAGING | Facility: CLINIC | Age: 36
End: 2021-06-04

## 2021-07-09 ENCOUNTER — RX RENEWAL (OUTPATIENT)
Age: 36
End: 2021-07-09

## 2021-08-08 ENCOUNTER — RX RENEWAL (OUTPATIENT)
Age: 36
End: 2021-08-08

## 2021-10-03 ENCOUNTER — RX RENEWAL (OUTPATIENT)
Age: 36
End: 2021-10-03

## 2022-04-11 PROBLEM — Z11.59 SCREENING FOR VIRAL DISEASE: Status: ACTIVE | Noted: 2021-04-27

## 2022-09-14 ENCOUNTER — APPOINTMENT (OUTPATIENT)
Dept: INTERNAL MEDICINE | Facility: CLINIC | Age: 37
End: 2022-09-14

## 2022-09-14 VITALS
HEIGHT: 68.9 IN | BODY MASS INDEX: 30.36 KG/M2 | DIASTOLIC BLOOD PRESSURE: 76 MMHG | HEART RATE: 74 BPM | OXYGEN SATURATION: 98 % | SYSTOLIC BLOOD PRESSURE: 117 MMHG | WEIGHT: 205 LBS | TEMPERATURE: 97.9 F

## 2022-09-14 DIAGNOSIS — U07.1 COVID-19: ICD-10-CM

## 2022-09-14 PROCEDURE — 99213 OFFICE O/P EST LOW 20 MIN: CPT

## 2022-09-14 RX ORDER — PREDNISONE 20 MG/1
20 TABLET ORAL DAILY
Qty: 10 | Refills: 0 | Status: DISCONTINUED | COMMUNITY
Start: 2022-01-11 | End: 2022-09-14

## 2022-09-14 RX ORDER — SILDENAFIL 20 MG/1
20 TABLET ORAL
Qty: 30 | Refills: 0 | Status: DISCONTINUED | COMMUNITY
Start: 2020-08-28 | End: 2022-09-14

## 2022-09-14 RX ORDER — FLUTICASONE PROPIONATE AND SALMETEROL 50; 250 UG/1; UG/1
250-50 POWDER RESPIRATORY (INHALATION)
Qty: 1 | Refills: 0 | Status: ACTIVE | COMMUNITY
Start: 2019-12-02 | End: 1900-01-01

## 2022-09-14 RX ORDER — FLUTICASONE PROPIONATE 50 UG/1
50 SPRAY, METERED NASAL
Qty: 1 | Refills: 3 | Status: DISCONTINUED | COMMUNITY
Start: 2017-05-30 | End: 2022-09-14

## 2022-09-14 RX ORDER — AMMONIUM LACTATE 12 %
12 CREAM (GRAM) TOPICAL TWICE DAILY
Qty: 1 | Refills: 3 | Status: DISCONTINUED | COMMUNITY
Start: 2018-12-11 | End: 2022-09-14

## 2022-09-14 NOTE — ADDENDUM
[FreeTextEntry1] : I, Sindi Sims, acted as a scribe on behalf of Dr. Aristeo Luevano MD, on 09/14/2022. \par \par All medical entries made by the scribe were at my, Dr. Aristeo Luevano MD, direction and personally dictated by me on 09/14/2022. I have reviewed the chart and agree that the record accurately reflects my personal performance of the history, physical exam, assessment and plan. I have also personally directed, reviewed, and agreed with the chart.

## 2022-09-14 NOTE — ASSESSMENT
[FreeTextEntry1] : -Continue with supportive care. Prednisone sent to pharmacy given Hx of asthma\par -Medication refills provided.

## 2022-09-14 NOTE — HISTORY OF PRESENT ILLNESS
[FreeTextEntry8] : Patient reports he tested positive for Covid-19 infection about 1 week ago.\par Symptoms include fatigue cough and chest tightness.\par Does have Hx of Asthma\par Does have body aches, improving\par denies diarrhea nausea vomiting\par denies SOB on exertion

## 2023-09-14 ENCOUNTER — NON-APPOINTMENT (OUTPATIENT)
Age: 38
End: 2023-09-14

## 2023-09-18 ENCOUNTER — APPOINTMENT (OUTPATIENT)
Dept: INTERNAL MEDICINE | Facility: CLINIC | Age: 38
End: 2023-09-18
Payer: COMMERCIAL

## 2023-09-18 ENCOUNTER — NON-APPOINTMENT (OUTPATIENT)
Age: 38
End: 2023-09-18

## 2023-09-18 VITALS
OXYGEN SATURATION: 98 % | WEIGHT: 228 LBS | BODY MASS INDEX: 34.16 KG/M2 | DIASTOLIC BLOOD PRESSURE: 53 MMHG | HEART RATE: 76 BPM | RESPIRATION RATE: 18 BRPM | HEIGHT: 68.5 IN | TEMPERATURE: 98.3 F | SYSTOLIC BLOOD PRESSURE: 108 MMHG

## 2023-09-18 DIAGNOSIS — J45.909 UNSPECIFIED ASTHMA, UNCOMPLICATED: ICD-10-CM

## 2023-09-18 DIAGNOSIS — Z00.00 ENCOUNTER FOR GENERAL ADULT MEDICAL EXAMINATION W/OUT ABNORMAL FINDINGS: ICD-10-CM

## 2023-09-18 DIAGNOSIS — E66.9 OBESITY, UNSPECIFIED: ICD-10-CM

## 2023-09-18 PROCEDURE — G0447 BEHAVIOR COUNSEL OBESITY 15M: CPT

## 2023-09-18 PROCEDURE — 90682 RIV4 VACC RECOMBINANT DNA IM: CPT

## 2023-09-18 PROCEDURE — G0008: CPT

## 2023-09-18 PROCEDURE — 99395 PREV VISIT EST AGE 18-39: CPT | Mod: 25

## 2023-09-18 PROCEDURE — 36415 COLL VENOUS BLD VENIPUNCTURE: CPT

## 2023-09-18 PROCEDURE — 93000 ELECTROCARDIOGRAM COMPLETE: CPT | Mod: 59

## 2023-09-18 RX ORDER — PREDNISONE 20 MG/1
20 TABLET ORAL DAILY
Qty: 10 | Refills: 0 | Status: DISCONTINUED | COMMUNITY
Start: 2022-09-14 | End: 2023-09-18

## 2023-09-24 LAB
25(OH)D3 SERPL-MCNC: 18 NG/ML
ALBUMIN SERPL ELPH-MCNC: 4.7 G/DL
ALP BLD-CCNC: 102 U/L
ALT SERPL-CCNC: 31 U/L
ANION GAP SERPL CALC-SCNC: 12 MMOL/L
APPEARANCE: CLEAR
AST SERPL-CCNC: 25 U/L
BASOPHILS # BLD AUTO: 0.02 K/UL
BASOPHILS NFR BLD AUTO: 0.3 %
BILIRUB SERPL-MCNC: 0.5 MG/DL
BILIRUBIN URINE: NEGATIVE
BLOOD URINE: NEGATIVE
BUN SERPL-MCNC: 14 MG/DL
CALCIUM SERPL-MCNC: 9.2 MG/DL
CHLORIDE SERPL-SCNC: 102 MMOL/L
CHOLEST SERPL-MCNC: 224 MG/DL
CO2 SERPL-SCNC: 26 MMOL/L
COLOR: YELLOW
CREAT SERPL-MCNC: 1.03 MG/DL
EGFR: 97 ML/MIN/1.73M2
EOSINOPHIL # BLD AUTO: 0.12 K/UL
EOSINOPHIL NFR BLD AUTO: 1.7 %
ESTIMATED AVERAGE GLUCOSE: 120 MG/DL
GLUCOSE QUALITATIVE U: NEGATIVE MG/DL
GLUCOSE SERPL-MCNC: 90 MG/DL
HBA1C MFR BLD HPLC: 5.8 %
HCT VFR BLD CALC: 43.7 %
HDLC SERPL-MCNC: 42 MG/DL
HGB BLD-MCNC: 14.4 G/DL
IMM GRANULOCYTES NFR BLD AUTO: 0.1 %
KETONES URINE: ABNORMAL MG/DL
LDLC SERPL CALC-MCNC: 134 MG/DL
LEUKOCYTE ESTERASE URINE: NEGATIVE
LYMPHOCYTES # BLD AUTO: 2.93 K/UL
LYMPHOCYTES NFR BLD AUTO: 42.7 %
MAN DIFF?: NORMAL
MCHC RBC-ENTMCNC: 32.3 PG
MCHC RBC-ENTMCNC: 33 GM/DL
MCV RBC AUTO: 98 FL
MONOCYTES # BLD AUTO: 0.6 K/UL
MONOCYTES NFR BLD AUTO: 8.7 %
NEUTROPHILS # BLD AUTO: 3.18 K/UL
NEUTROPHILS NFR BLD AUTO: 46.5 %
NITRITE URINE: NEGATIVE
NONHDLC SERPL-MCNC: 183 MG/DL
PH URINE: 6.5
PLATELET # BLD AUTO: 258 K/UL
POTASSIUM SERPL-SCNC: 4.6 MMOL/L
PROT SERPL-MCNC: 6.9 G/DL
PROTEIN URINE: NEGATIVE MG/DL
RBC # BLD: 4.46 M/UL
RBC # FLD: 12.1 %
SODIUM SERPL-SCNC: 140 MMOL/L
SPECIFIC GRAVITY URINE: 1.03
TRIGL SERPL-MCNC: 274 MG/DL
TSH SERPL-ACNC: 0.65 UIU/ML
UROBILINOGEN URINE: 1 MG/DL
VIT B12 SERPL-MCNC: 595 PG/ML
WBC # FLD AUTO: 6.86 K/UL

## 2023-09-27 RX ORDER — BUPROPION HYDROCHLORIDE 300 MG/1
300 TABLET, EXTENDED RELEASE ORAL
Qty: 90 | Refills: 3 | Status: ACTIVE | COMMUNITY
Start: 2019-09-19 | End: 1900-01-01

## 2023-09-27 RX ORDER — SILDENAFIL 20 MG/1
20 TABLET ORAL
Qty: 30 | Refills: 0 | Status: ACTIVE | COMMUNITY
Start: 2022-09-14 | End: 1900-01-01

## 2023-09-28 ENCOUNTER — TRANSCRIPTION ENCOUNTER (OUTPATIENT)
Age: 38
End: 2023-09-28

## 2023-10-01 ENCOUNTER — TRANSCRIPTION ENCOUNTER (OUTPATIENT)
Age: 38
End: 2023-10-01

## 2023-10-01 RX ORDER — SEMAGLUTIDE 0.25 MG/.5ML
0.25 INJECTION, SOLUTION SUBCUTANEOUS
Qty: 1 | Refills: 0 | Status: ACTIVE | COMMUNITY
Start: 2023-09-18 | End: 1900-01-01

## 2023-10-20 ENCOUNTER — NON-APPOINTMENT (OUTPATIENT)
Age: 38
End: 2023-10-20

## 2023-10-20 RX ORDER — NIRMATRELVIR AND RITONAVIR 300-100 MG
20 X 150 MG & KIT ORAL
Qty: 1 | Refills: 0 | Status: ACTIVE | COMMUNITY
Start: 2023-10-20 | End: 1900-01-01